# Patient Record
Sex: FEMALE | Race: WHITE | NOT HISPANIC OR LATINO | Employment: UNEMPLOYED | URBAN - METROPOLITAN AREA
[De-identification: names, ages, dates, MRNs, and addresses within clinical notes are randomized per-mention and may not be internally consistent; named-entity substitution may affect disease eponyms.]

---

## 2018-02-20 ENCOUNTER — COMMUNICATION - HEALTHEAST (OUTPATIENT)
Dept: SCHEDULING | Facility: CLINIC | Age: 28
End: 2018-02-20

## 2018-07-30 ENCOUNTER — HOSPITAL ENCOUNTER (OUTPATIENT)
Dept: OBGYN | Facility: HOSPITAL | Age: 28
Discharge: HOME | End: 2018-07-30
Attending: FAMILY MEDICINE | Admitting: FAMILY MEDICINE

## 2018-07-30 LAB
ABO/RH(D): NORMAL
ALBUMIN UR-MCNC: ABNORMAL MG/DL
ALT SERPL W P-5'-P-CCNC: 18 U/L (ref 0–45)
ANTIBODY SCREEN: NEGATIVE
APTT PPP: 28 SECONDS (ref 24–37)
AST SERPL W P-5'-P-CCNC: 23 U/L (ref 0–40)
CREAT SERPL-MCNC: 0.66 MG/DL (ref 0.6–1.1)
CREAT UR-MCNC: 215.3 MG/DL
ERYTHROCYTE [DISTWIDTH] IN BLOOD BY AUTOMATED COUNT: 12.4 % (ref 11–14.5)
GFR SERPL CREATININE-BSD FRML MDRD: >60 ML/MIN/1.73M2
GLUCOSE UR STRIP-MCNC: NEGATIVE MG/DL
HCT VFR BLD AUTO: 32.5 % (ref 35–47)
HGB BLD-MCNC: 10.7 G/DL (ref 12–16)
INR PPP: 1 (ref 0.9–1.1)
KETONES UR STRIP-MCNC: NEGATIVE MG/DL
MCH RBC QN AUTO: 29.6 PG (ref 27–34)
MCHC RBC AUTO-ENTMCNC: 32.9 G/DL (ref 32–36)
MCV RBC AUTO: 90 FL (ref 80–100)
PLATELET # BLD AUTO: 234 THOU/UL (ref 140–440)
PMV BLD AUTO: 11.3 FL (ref 8.5–12.5)
PROTEIN, RANDOM URINE - HISTORICAL: 27 MG/DL
PROTEIN/CREAT RATIO, RANDOM UR: 0.13
RBC # BLD AUTO: 3.62 MILL/UL (ref 3.8–5.4)
URATE SERPL-MCNC: 5.2 MG/DL (ref 2–7.5)
WBC: 10.7 THOU/UL (ref 4–11)

## 2018-07-30 ASSESSMENT — MIFFLIN-ST. JEOR: SCORE: 1596.32

## 2018-08-01 LAB
ALLERGIC TO PENICILLIN: NO
GP B STREP DNA SPEC QL NAA+PROBE: NEGATIVE

## 2018-08-06 LAB
ALCOHOL, URINE (MT) - HISTORICAL: NEGATIVE GM/DL
AMPHETAMINES (MT) - HISTORICAL: POSITIVE
AMPHETAMINES SPEC QL: ABNORMAL
COCAINE (MT) - HISTORICAL: NEGATIVE
CREAT UR-MCNC: 206 MG/DL
OPIATES (MT) - HISTORICAL: NEGATIVE
OXYCODONE SERPLBLD CFM-MCNC: NEGATIVE NG/ML
PHENCYCLIDINE (PCP)MT - HISTORICAL: NEGATIVE
SPECIMEN STATUS: ABNORMAL
THC MARIJUANA METABOLITE - HISTORICAL: NEGATIVE

## 2018-08-12 ENCOUNTER — COMMUNICATION - HEALTHEAST (OUTPATIENT)
Dept: SCHEDULING | Facility: CLINIC | Age: 28
End: 2018-08-12

## 2018-08-20 ENCOUNTER — RECORDS - HEALTHEAST (OUTPATIENT)
Dept: ADMINISTRATIVE | Facility: OTHER | Age: 28
End: 2018-08-20

## 2019-02-28 ENCOUNTER — OFFICE VISIT (OUTPATIENT)
Dept: OPHTHALMOLOGY | Facility: CLINIC | Age: 29
End: 2019-02-28
Attending: OPHTHALMOLOGY
Payer: COMMERCIAL

## 2019-02-28 DIAGNOSIS — H10.012 ACUTE FOLLICULAR CONJUNCTIVITIS OF LEFT EYE: Primary | ICD-10-CM

## 2019-02-28 PROCEDURE — G0463 HOSPITAL OUTPT CLINIC VISIT: HCPCS | Mod: ZF

## 2019-02-28 ASSESSMENT — CONF VISUAL FIELD
OD_NORMAL: 1
OS_NORMAL: 1

## 2019-02-28 ASSESSMENT — SLIT LAMP EXAM - LIDS: COMMENTS: NORMAL

## 2019-02-28 ASSESSMENT — VISUAL ACUITY
OS_SC: 20/30
METHOD: SNELLEN - LINEAR
OD_SC: 20/20

## 2019-02-28 ASSESSMENT — TONOMETRY
OD_IOP_MMHG: 18
OS_IOP_MMHG: 22
IOP_METHOD: TONOPEN

## 2019-02-28 ASSESSMENT — CUP TO DISC RATIO
OD_RATIO: 0.3
OS_RATIO: 0.3

## 2019-02-28 ASSESSMENT — EXTERNAL EXAM - RIGHT EYE: OD_EXAM: NORMAL

## 2019-02-28 NOTE — PROGRESS NOTES
HPI     Eye Infection Left Eye     In left eye.  Associated symptoms include tearing, lid swelling, photophobia and blurred vision.  Negative for itching.              Comments     Pt here for an evaluation for possible eye infection LE. Pt states symptoms first started 2 days ago, the LE was pink and irritated. This AM when pt woke up LE was swollen, really red, and watering a lot. Pt notes her son has viral pink eye and was given drops, so pt put in one of his drops in her eye (polymyacin). Pt has not put in any more drops in today. Pt notes pain only when she looks to the left, the eye itself does not hurt or have any pain. There is a little bit of light sensitivity. There was a little mattering yesterday, but just watery today.     HELADIO Lambert 3:31 PM February 28, 2019             Last edited by Aleksandra Johnson COMT on 2/28/2019  3:35 PM. (History)          POH: readers  PMH: none   FHx: no glaucoma    SHx: 7 pack years    Ocular Meds: none      ASSESSMENT & PLAN:    Kristie Rose is a 29 year old female with the following diagnoses:     # Follicular conjunctivitis left eye  Gradual onset of photophobia and injection over past two day, patient report boyfriend and son both have pink eye  She presented to outside ED, a CT scan was obtained that revealed air abutting the globe, she was sent here for further evaluation   Exam with follicular conjunctivitis, likely viral, also present is chemosis that is likely the air seen on previous CT scan  Presentation not consistent with pre-septal cellulitis   Discussed natural course  Start hand hygiene   Start AT four times a day left eye   Cool compress as needed     Patient disposition: Return in about 3 weeks (around 3/21/2019) for Follow Up if not improved, v/t .     Abran Bazan MD  Ophthalmology Resident, PGY-3  [facility]     Not seen by staff during this visit, available should need have arisen.  Plan appropriate as above.    Magdaleno Laguna,  MD  , Comprehensive Ophthalmology  Department of Ophthalmology and Visual Neurosciences  [facility]

## 2019-02-28 NOTE — NURSING NOTE
Chief Complaint(s) and History of Present Illness(es)     Eye Infection Left Eye     In left eye.  Associated symptoms include tearing, lid swelling, photophobia and blurred vision.  Negative for itching.              Comments     Pt here for an evaluation for possible eye infection LE. Pt states symptoms first started 2 days ago, the LE was pink and irritated. This AM when pt woke up LE was swollen, really red, and watering a lot. Pt notes her son has viral pink eye and was given drops, so pt put in one of his drops in her eye (polymyacin). Pt has not put in any more drops in today. Pt notes pain only when she looks to the left, the eye itself does not hurt or have any pain. There is a little bit of light sensitivity. There was a little mattering yesterday, but just watery today.     Aleksandra Johnson, COMT 3:31 PM February 28, 2019

## 2019-03-01 ENCOUNTER — TELEPHONE (OUTPATIENT)
Dept: OPTOMETRY | Facility: CLINIC | Age: 29
End: 2019-03-01

## 2019-03-01 NOTE — TELEPHONE ENCOUNTER
Please schedule patient with any resident clinic on 3/4 or 3/5.   Pt was seen in outside ER today and needs follow-up.     =--    Message above received by triage this Friday afternoon by eye MD on call.    Pt last seen yesterday in clinic and needing follow after going to outside ED today per message above    Scheduled on Monday with Dr Barnett at 0830 AM    Called pt 3 times to update pt on date/time  [phone]  Number states not excepting calls      Note to Dr. Tiffany Chapa RN 2:36 PM 03/01/19

## 2019-03-06 ENCOUNTER — TELEPHONE (OUTPATIENT)
Dept: OPHTHALMOLOGY | Facility: CLINIC | Age: 29
End: 2019-03-06

## 2019-03-06 NOTE — TELEPHONE ENCOUNTER
M[place] Kristie [phone]     Was able to reach pt 3rd attempt at [phone]  Scheduled f/u appt from ED visit 3-1-19 with Dr. Enrique tomorrow afternoon      Pt satisfied with date/time  Mauricio Chapa RN 3:52 PM 03/06/19    Previously reached out to pt last Friday and was unable reach/leave voicemail              [facility]    Phone Message    May a detailed message be left on voicemail: yes    Reason for Call: Other: per pt- was seen in the ED on 02/28/2019 for left eye swelling, it is getting worse, excessive tears, super sensitive to light, and bleeding occasionally- pt missed appt on 03/04/2019 due to her numbering changing, i updated #. please call pt to schedule a hospital f/u thank you      Action Taken: Message routed to:  [facility] ([facility] eye

## 2019-03-07 ENCOUNTER — OFFICE VISIT (OUTPATIENT)
Dept: OPHTHALMOLOGY | Facility: CLINIC | Age: 29
End: 2019-03-07
Attending: OPHTHALMOLOGY
Payer: COMMERCIAL

## 2019-03-07 DIAGNOSIS — H10.013 ACUTE FOLLICULAR CONJUNCTIVITIS OF BOTH EYES: Primary | ICD-10-CM

## 2019-03-07 PROCEDURE — 87491 CHLMYD TRACH DNA AMP PROBE: CPT | Performed by: OPHTHALMOLOGY

## 2019-03-07 PROCEDURE — G0463 HOSPITAL OUTPT CLINIC VISIT: HCPCS | Mod: ZF

## 2019-03-07 PROCEDURE — 84999 UNLISTED CHEMISTRY PROCEDURE: CPT | Performed by: STUDENT IN AN ORGANIZED HEALTH CARE EDUCATION/TRAINING PROGRAM

## 2019-03-07 PROCEDURE — 87253 VIRUS INOCULATE TISSUE ADDL: CPT | Performed by: STUDENT IN AN ORGANIZED HEALTH CARE EDUCATION/TRAINING PROGRAM

## 2019-03-07 PROCEDURE — 84999 UNLISTED CHEMISTRY PROCEDURE: CPT | Mod: ZF | Performed by: STUDENT IN AN ORGANIZED HEALTH CARE EDUCATION/TRAINING PROGRAM

## 2019-03-07 PROCEDURE — 87523 HEPATITIS D QUANTIFICATION: CPT | Mod: ZF | Performed by: STUDENT IN AN ORGANIZED HEALTH CARE EDUCATION/TRAINING PROGRAM

## 2019-03-07 PROCEDURE — 87252 VIRUS INOCULATION TISSUE: CPT | Performed by: OPHTHALMOLOGY

## 2019-03-07 PROCEDURE — 87070 CULTURE OTHR SPECIMN AEROBIC: CPT | Performed by: OPHTHALMOLOGY

## 2019-03-07 RX ORDER — FLUOROMETHOLONE 0.1 %
1 SUSPENSION, DROPS(FINAL DOSAGE FORM)(ML) OPHTHALMIC (EYE) 2 TIMES DAILY
Qty: 10 ML | Refills: 0 | Status: SHIPPED | OUTPATIENT
Start: 2019-03-07

## 2019-03-07 ASSESSMENT — CONF VISUAL FIELD
OS_NORMAL: 1
OD_NORMAL: 1

## 2019-03-07 ASSESSMENT — VISUAL ACUITY
OS_CC: 20/30
OD_CC+: -1
OD_CC: 20/25
METHOD: SNELLEN - LINEAR

## 2019-03-07 ASSESSMENT — TONOMETRY
IOP_METHOD: ICARE
OS_IOP_MMHG: 10
OD_IOP_MMHG: 10

## 2019-03-07 ASSESSMENT — EXTERNAL EXAM - RIGHT EYE: OD_EXAM: PERIORBITAL ERYTHEMA

## 2019-03-07 NOTE — NURSING NOTE
Chief Complaints and History of Present Illnesses   Patient presents with     Conjunctivitis Follow-Up     Chief Complaint(s) and History of Present Illness(es)     Conjunctivitis Follow-Up     Laterality: left eye              Comments     Pt. States that she started with pain and discomfort LE a week ago.  Has now moved in to RE.  Extremely light sensitive.   Has been taking clindamycin for step.  Andreea Chavez COT 1:13 PM March 7, 2019

## 2019-03-07 NOTE — PROGRESS NOTES
HPI     Conjunctivitis Follow-Up     In left eye.              Comments     Pt. States that she started with pain and discomfort LE a week ago.  Has now moved in to RE.  Extremely light sensitive.   Has been taking clindamycin for step.  Andreea Chavez COT 1:13 PM March 7, 2019               Last edited by Andreea Chavez on 3/7/2019  1:13 PM. (History)           Seen by Dr. Bazan 2/28/19 and diagnosed with follicular conjunctivitis in the left eye. Was then seen at outside ED 3/1/19 for left periorbital cellulitis and started on clindamycin 150mg orally three times a day x10d (currently on day 7).     She reports that she continue to be very light sensitive especially in the morning - describes significant eye pain (LEFT eye>RIGHT eye). Significant mucous drainage both eyes. Has noticed bloody tears when wiping the eyes. Not on any eyedrops currently.     Patient was diagnosed with strep throat at ED 3/1/19.     No systemic symptoms including dysuria, vaginal discharge, concern for STI. Son (6mo old) had pinkeye for 1 week 1-2 weeks ago.     POH: no CL use  PMH: none   SHx: 7 pack years    Ocular Meds: none       ASSESSMENT & PLAN:    Kristie Rose is a 29 year old female with the following diagnoses:     # Acute conjunctivitis, both eyes (LEFT > RIGHT)  -Concern for EKC (adenovirus) due to subepithelial infiltrates and significant conjunctival membranes   -Swabs sent to lab (bacterial, viral, chlamydia) - pending  -Start FML twice a day left eye (discussed that this may prolong viral shedding)  -Discussed hand hygiene given high degree of contagiousness  -Discussed return precautions    Patient disposition: Return in about 1 week (around 3/14/2019) for f/u conjunctivitis.     Lisa Enrique MD   Ophthalmology PGY-4    Attending Physician Attestation:  Complete documentation of historical and exam elements from today's encounter can be found in the full encounter summary report (not reduplicated in this  progress note).  I personally obtained the chief complaint(s) and history of present illness.  I confirmed and edited as necessary the review of systems, past medical/surgical history, family history, social history, and examination findings as documented by others; and I examined the patient myself.  I personally reviewed the relevant tests, images, and reports as documented above.  I formulated and edited as necessary the assessment and plan and discussed the findings and management plan with the patient and family. . - Magdaleno Laguna MD

## 2019-03-08 LAB
C TRACH DNA SPEC QL NAA+PROBE: NEGATIVE
SPECIMEN SOURCE: NORMAL

## 2019-03-09 LAB
BACTERIA SPEC CULT: NO GROWTH
Lab: NORMAL
SPECIMEN SOURCE: NORMAL

## 2019-03-21 LAB
SPECIMEN SOURCE: NORMAL
VIRUS SPEC CULT: NORMAL
VIRUS SPEC CULT: NORMAL

## 2019-03-22 ENCOUNTER — HEALTH MAINTENANCE LETTER (OUTPATIENT)
Age: 29
End: 2019-03-22

## 2020-01-03 ENCOUNTER — COMMUNICATION - HEALTHEAST (OUTPATIENT)
Dept: SCHEDULING | Facility: CLINIC | Age: 30
End: 2020-01-03

## 2020-03-01 ENCOUNTER — HEALTH MAINTENANCE LETTER (OUTPATIENT)
Age: 30
End: 2020-03-01

## 2020-08-13 ENCOUNTER — COMMUNICATION - HEALTHEAST (OUTPATIENT)
Dept: SCHEDULING | Facility: CLINIC | Age: 30
End: 2020-08-13

## 2020-08-14 ENCOUNTER — OFFICE VISIT - HEALTHEAST (OUTPATIENT)
Dept: FAMILY MEDICINE | Facility: CLINIC | Age: 30
End: 2020-08-14

## 2020-08-14 DIAGNOSIS — N92.6 MISSED MENSES: ICD-10-CM

## 2020-08-14 DIAGNOSIS — A08.4 VIRAL GASTROENTERITIS: ICD-10-CM

## 2020-08-14 DIAGNOSIS — Z32.01 PREGNANCY TEST POSITIVE: ICD-10-CM

## 2020-08-14 LAB — HCG UR QL: POSITIVE

## 2020-08-19 ENCOUNTER — COMMUNICATION - HEALTHEAST (OUTPATIENT)
Dept: FAMILY MEDICINE | Facility: CLINIC | Age: 30
End: 2020-08-19

## 2020-11-15 ENCOUNTER — COMMUNICATION - HEALTHEAST (OUTPATIENT)
Dept: SCHEDULING | Facility: CLINIC | Age: 30
End: 2020-11-15

## 2020-12-14 ENCOUNTER — HEALTH MAINTENANCE LETTER (OUTPATIENT)
Age: 30
End: 2020-12-14

## 2020-12-16 ENCOUNTER — COMMUNICATION - HEALTHEAST (OUTPATIENT)
Dept: SCHEDULING | Facility: CLINIC | Age: 30
End: 2020-12-16

## 2021-04-17 ENCOUNTER — HEALTH MAINTENANCE LETTER (OUTPATIENT)
Age: 31
End: 2021-04-17

## 2021-06-01 VITALS — BODY MASS INDEX: 34.37 KG/M2 | BODY MASS INDEX: 35.43 KG/M2 | WEIGHT: 194 LBS | HEIGHT: 63 IN

## 2021-06-01 VITALS — HEIGHT: 63 IN | WEIGHT: 200 LBS | BODY MASS INDEX: 35.44 KG/M2

## 2021-06-04 VITALS
RESPIRATION RATE: 17 BRPM | BODY MASS INDEX: 35.36 KG/M2 | OXYGEN SATURATION: 97 % | TEMPERATURE: 98.6 F | WEIGHT: 206 LBS | DIASTOLIC BLOOD PRESSURE: 75 MMHG | SYSTOLIC BLOOD PRESSURE: 106 MMHG | HEART RATE: 91 BPM

## 2021-06-04 NOTE — TELEPHONE ENCOUNTER
Pt called to discuss light pink vaginal bleeding. Pt is 7 weeks pregnant. Pt stated this was the first time she has noticed any vaginal discharge. Pt stated she has had intercourse within last 24 hours. Pt feeling mild rt sided cramping for 24 hours. Per protocol home care suggestions were reviewed and understood. Plan is to call back if pain increase, vaginal bleeding worsens or pass any tissue. Pt also will contact PCP to be seen within next 24 hours. Pt agreed with plan    Farheen Carbone RN [facility] Triage/Medication Refill      Reason for Disposition    [1] Intermittent lower abdominal pain (e.g., cramping) AND [2] present > 24 hours    Protocols used: PREGNANCY - VAGINAL BLEEDING LESS THAN 20 WEEKS [facility]-

## 2021-06-10 NOTE — TELEPHONE ENCOUNTER
"Pt reports she is six weeks pregnant and \"no solid poop in three days\". Vomiting yesterday but not today. Pt reports stool is \"red\" however, per pt \"tinted\" and pt reports drinking a drink with red # 40 in it. Pt does not think redness is blood. Pt reports pain right above belly button \"3\" and  intermittent. Urinated five minutes ago. Pt does not think she has a fever. Pt reports approximately five more stools than normal daily for past three days and quite watery.    Advised pt to increase fluids, avoid drinks with food dye. Mix water and gatorade and eat starchy salty food for now ie saltines, pretzels. Call back if condition changes prior to being seen. See provider within 24 hours per protocol.    Pt verbalizes understanding and agrees to plan.       Reason for Disposition    [1] MODERATE diarrhea (e.g., 4-6 times / day more than normal) AND [2] present > 48 hours (2 days)    Additional Information    Negative: Shock suspected (e.g., cold/pale/clammy skin, too weak to stand, low BP, rapid pulse)    Negative: Difficult to awaken or acting confused (e.g., disoriented, slurred speech)    Negative: Sounds like a life-threatening emergency to the triager    Negative: Vomiting also present and worse than the diarrhea    Negative: [1] Blood in stool AND [2] without diarrhea    Negative: Diarrhea in a cancer patient who is currently (or recently) receiving chemotherapy or radiation therapy, or cancer patient who has metastatic or end-stage cancer and is receiving palliative care    Negative: [1] SEVERE abdominal pain (e.g., excruciating) AND [2] present > 1 hour    Negative: [1] SEVERE abdominal pain AND [2] age > 60    Negative: [1] Blood in the stool AND [2] moderate or large amount of blood    Negative: Black or tarry bowel movements  (Exception: chronic-unchanged  black-grey bowel movements AND is taking iron pills or Pepto-bismol)    Negative: [1] Drinking very little AND [2] dehydration suspected (e.g., no urine > " 12 hours, very dry mouth, very lightheaded)    Negative: Patient sounds very sick or weak to the triager    Negative: [1] SEVERE diarrhea (e.g., 7 or more times / day more than normal) AND [2] age > 60 years    Negative: [1] Constant abdominal pain AND [2] present > 2 hours    Negative: [1] Fever > 103 F (39.4 C) AND [2] not able to get the fever down using Fever Care Advice    Negative: [1] SEVERE diarrhea (e.g., 7 or more times / day more than normal) AND [2] present > 24 hours (1 day)    Protocols used: [facility]
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
## 2021-06-10 NOTE — TELEPHONE ENCOUNTER
Pt needs to set up 1st pregnancy apt with womens care to follow her pregnancy. Positive pregnancy test 8/14/20 at [facility]

## 2021-06-13 NOTE — TELEPHONE ENCOUNTER
"Pt reports \"I brought brother in law to hospital for chest pain and he tested positive in hospital for Covid 19\" this evening. Pt wondering \"how long do I have to wait to get tested\". Pt reports she is 24w4d pregnant. Pt reports no acute symptoms. Brother in elaina symptoms started today. Ob/gyn is through [facility]. Pt reports she was in the car with brother in law for 12-15 minutes today and he had a mask on.     Reviewed Care Advice with pt, see below. Advised pt to contact her ob/gyn in the morning.     Pt verbalizes understanding and agrees to plan.     COVID 19 Nurse Triage Plan/Patient Instructions    Please be aware that novel coronavirus (COVID-19) may be circulating in the community. If you develop symptoms such as fever, cough, or SOB or if you have concerns about the presence of another infection including coronavirus (COVID-19), please contact your health care provider or visit [email or web address].     Disposition/Instructions    Additional COVID19 information to add for patients.   How can I protect others?  If you have symptoms (fever, cough, body aches or trouble breathing): Stay home and away from others (self-isolate) until:    At least 10 days have passed since your symptoms started, And     You ve had no fever--and no medicine that reduces fever--for 1 full day (24 hours), And      Your other symptoms have resolved (gotten better).     If you don t have symptoms, but a test showed that you have COVID-19 (you tested positive):    Stay home and away from others (self-isolate). Follow the tips under \"How do I self-isolate?\" below for 10 days (20 days if you have a weak immune system).    You don't need to be retested for COVID-19 before going back to school or work. As long as you're fever-free and feeling better, you can go back to school, work and other activities after waiting the 10 or 20 days.     How do I self-isolate?    Stay in your own room, even for meals. Use your own bathroom if you can.     Stay " away from others in your home. No hugging, kissing or shaking hands. No visitors.    Don t go to work, school or anywhere else.     Clean  high touch  surfaces often (doorknobs, counters, handles, etc.). Use a household cleaning spray or wipes. You ll find a full list on the EPA website:  www.epa.gov/pesticide-registration/list-n-disinfectants-use-against-sars-cov-2.    Cover your mouth and nose with a mask, tissue or washcloth to avoid spreading germs.    Wash your hands and face often. Use soap and water.    Caregivers in these groups are at risk for severe illness due to COVID-19:  o People 65 years and older  o People who live in a nursing home or long-term care facility  o People with chronic disease (lung, heart, cancer, diabetes, kidney, liver, immunologic)  o People who have a weakened immune system, including those who:  - Are in cancer treatment  - Take medicine that weakens the immune system, such as corticosteroids  - Had a bone marrow or organ transplant  - Have an immune deficiency  - Have poorly controlled HIV or AIDS  - Are obese (body mass index of 40 or higher)  - Smoke regularly    Caregivers should wear gloves while washing dishes, handling laundry and cleaning bedrooms and bathrooms.    Use caution when washing and drying laundry: Don t shake dirty laundry, and use the warmest water setting that you can.    For more tips, go to www.cdc.gov/coronavirus/2019-ncov/downloads/10Things.pdf.    How can I take care of myself?  1. Get lots of rest. Drink extra fluids (unless a doctor has told you not to).     2. Take Tylenol (acetaminophen) for fever or pain. If you have liver or kidney problems, ask your family doctor if it s okay to take Tylenol.     Adults can take either:     650 mg (two 325 mg pills) every 4 to 6 hours, or     1,000 mg (two 500 mg pills) every 8 hours as needed.     Note: Don t take more than 3,000 mg in one day.   Acetaminophen is found in many medicines (both prescribed and  over-the-counter medicines). Read all labels to be sure you don t take too much.     For children, check the Tylenol bottle for the right dose. The dose is based on the child s age or weight.    3. If you have other health problems (like cancer, heart failure, an organ transplant or severe kidney disease): Call your specialty clinic if you don t feel better in the next 2 days.    4. Know when to call 911: Emergency warning signs include:    Trouble breathing or shortness of breath    Pain or pressure in the chest that doesn t go away    Feeling confused like you haven t felt before, or not being able to wake up    Bluish-colored lips or face    What are the symptoms of COVID-19?     The most common symptoms are cough, fever and trouble breathing.     Less common symptoms include body aches, chills, diarrhea (loose, watery poops), fatigue (feeling very tired), headache, runny nose, sore throat and loss of smell.    COVID-19 can cause severe coughing (bronchitis) and lung infection (pneumonia).    How does it spread?     The virus may spread when a person coughs or sneezes into the air. The virus can travel about 6 feet this way, and it can live on surfaces.      Common  (household disinfectants) will kill the virus.    Who is at risk?  Anyone can catch COVID-19 if they re around someone who has the virus.    How can others protect themselves?     Stay away from people who have COVID-19 (or symptoms of COVID-19).    Wash hands often with soap and water. Or, use hand  with at least 60% alcohol.    Avoid touching the eyes, nose or mouth.     Wear a face mask when you go out in public, when sick or when caring for a sick person.    Where can I get more information?     [employer]: About COVID-19: [email or web address]    CDC: What to Do If You re Sick: www.cdc.gov/coronavirus/2019-ncov/about/steps-when-sick.html    CDC: Ending Home Isolation:  www.cdc.gov/coronavirus/2019-ncov/hcp/disposition-in-home-patients.html     CDC: Caring for Someone: www.cdc.gov/coronavirus/2019-ncov/if-you-are-sick/care-for-someone.html     [facility]: Interim Guidance for Hospital Discharge to Home: www.[facility]/diseases/coronavirus/hcp/hospdischarge.pdf    [facility] clinical trials (COVID-19 research studies): clinicalaffairs.[facility]-clinical-trials     Below are the COVID-19 hotlines at the [place] [facility] ([facility]). Interpreters are available.   o For health questions: Call 045-215-3807 or 1-371.472.5400 (7 a.m. to 7 p.m.)  o For questions about schools and childcare: Call 974-808-6585 or 1-284.934.7625 (7 a.m. to 7 p.m.)              Thank you for taking steps to prevent the spread of this virus.  o Limit your contact with others.  o Wear a simple mask to cover your cough.  o Wash your hands well and often.    Resources    [facility]: About COVID-19: [email or web address]    CDC: What to Do If You're Sick: www.cdc.gov/coronavirus/2019-ncov/about/steps-when-sick.html    CDC: Ending Home Isolation: www.cdc.gov/coronavirus/2019-ncov/hcp/disposition-in-home-patients.html     CDC: Caring for Someone: www.cdc.gov/coronavirus/2019-ncov/if-you-are-sick/care-for-someone.html     [facility]: Interim Guidance for Hospital Discharge to Home: www.[facility]./diseases/coronavirus/hcp/hospdischarge.pdf    [facility] clinical trials (COVID-19 research studies): clinicalaffairs.[facility]-clinical-trials     Below are the COVID-19 hotlines at the [place] [facility] ([facility]). Interpreters are available.   o For health questions: Call 922-654-7135 or 1-813.562.8358 (7 a.m. to 7 p.m.)  o For questions about schools and childcare: Call 241-987-9986 or 1-676.372.3001 (7 a.m. to 7 p.m.)       Reason for Disposition    [1] CLOSE CONTACT COVID-19 EXPOSURE within last 14 days AND [2] NO symptoms    Additional Information     Negative: COVID-19 lab test positive    Negative: [1] Lives with someone known to have influenza (flu test positive) AND [2] flu-like symptoms (e.g., cough, runny nose, sore throat, SOB; with or without fever)    Negative: [1] Symptoms of COVID-19 (e.g., cough, fever, SOB, or others) AND [2] HCP diagnosed COVID-19 based on symptoms    Negative: [1] Symptoms of COVID-19 (e.g., cough, fever, SOB, or others) AND [2] lives in an area with community spread    Negative: [1] Symptoms of COVID-19 (e.g., cough, fever, SOB, or others) AND [2] within 14 days of EXPOSURE (close contact) with diagnosed or suspected COVID-19 patient    Negative: [1] Symptoms of COVID-19 (e.g., cough, fever, SOB, or others) AND [2] within 14 days of travel from high-risk area for COVID-19 community spread (identified by CDC)    Negative: [1] Difficulty breathing (shortness of breath) occurs AND [2] onset > 14 days after COVID-19 EXPOSURE (Close Contact)    Negative: [1] Dry cough occurs AND [2] onset > 14 days after COVID-19 EXPOSURE    Negative: [1] Wet cough (i.e., white-yellow, yellow, green, or thaddeus colored sputum) AND [2] onset > 14 days after COVID-19 EXPOSURE    Negative: [1] Common cold symptoms AND [2] onset > 14 days after COVID-19 EXPOSURE    Negative: [1] CLOSE CONTACT COVID-19 EXPOSURE within last 14 days AND [2] needs COVID-19 lab test to return to work AND [3] NO symptoms    Negative: [1] CLOSE CONTACT COVID-19 EXPOSURE within last 14 days AND [2] exposed person is a  (e.g., police or paramedic) AND [3] NO symptoms    Negative: [1] CLOSE CONTACT COVID-19 EXPOSURE within last 14 days AND [2] exposed person is a healthcare worker who was NOT using all recommended personal protective equipment (i.e., a respirator-N95 mask, eye protection, gloves, and gown) AND [3] NO symptoms    Negative: [1] Living or working in a correctional facility, long-term care facility, or shelter (i.e., congregate setting; densely populated)  AND [2] where an outbreak has occurred AND [3] NO symptoms    Protocols used: CORONAVIRUS (COVID-19) EXPOSURE-A- 12.1.20

## 2021-06-16 PROBLEM — Z98.891 STATUS POST REPEAT LOW TRANSVERSE CESAREAN SECTION: Status: ACTIVE | Noted: 2018-08-14

## 2021-06-16 PROBLEM — O99.323 SUBSTANCE ABUSE AFFECTING PREGNANCY IN THIRD TRIMESTER, ANTEPARTUM (H): Status: ACTIVE | Noted: 2018-08-13

## 2021-06-16 PROBLEM — F19.10 SUBSTANCE ABUSE AFFECTING PREGNANCY IN THIRD TRIMESTER, ANTEPARTUM (H): Status: ACTIVE | Noted: 2018-08-13

## 2021-06-17 NOTE — TELEPHONE ENCOUNTER
"Telephone Encounter by Miranda Gong RN at 11/15/2020  9:46 AM     Author: Miranda Gong RN Service: -- Author Type: Registered Nurse    Filed: 11/15/2020  9:55 AM Encounter Date: 11/15/2020 Status: Signed    : Miranda Gong RN (Registered Nurse)       Pt calling for education on pregnancy approved medications for nasal congestion.    Education given per home care advice and [facility] OneNote resource:         Patient verbalized understanding and had no further questions.      Miranda Gong RN, FNA      Additional Information  ? Negative: MORE THAN A DOUBLE DOSE of a prescription or over-the-counter (OTC) drug  ? Negative: [1] DOUBLE DOSE (an extra dose or lesser amount) of over-the-counter (OTC) drug AND [2] any symptoms (e.g., dizziness, nausea, pain, sleepiness)  ? Negative: [1] DOUBLE DOSE (an extra dose or lesser amount) of prescription drug AND [2] any symptoms (e.g., dizziness, nausea, pain, sleepiness)  ? Negative: Took another person's prescription drug  ? Negative: [1] DOUBLE DOSE (an extra dose or lesser amount) of prescription drug AND [2] NO symptoms (Exception: a double dose of antibiotics)  ? Negative: Diabetes drug error or overdose (e.g., insulin or extra dose)  ? Negative: [1] Request for URGENT new prescription or refill of \"essential\" medication (i.e., likelihood of harm to patient if not taken) AND [2] triager unable to fill per unit policy  ? Negative: [1] Prescription not at pharmacy AND [2] was prescribed today by PCP  ? Negative: Pharmacy calling with prescription questions and triager unable to answer question  ? Negative: Caller has URGENT medication question about med that PCP prescribed and triager unable to answer question  ? Negative: Caller has NON-URGENT medication question about med that PCP prescribed and triager unable to answer question  ? Negative: Caller requesting a NON-URGENT new prescription or refill and triager unable to refill per unit policy  ? Negative: " Caller has medication question about med not prescribed by PCP and triager unable to answer question (e.g., compatibility with other med, storage)  ? Negative: [1] DOUBLE DOSE (an extra dose or lesser amount) of over-the-counter (OTC) drug AND [2] NO symptoms  ? Negative: [1] DOUBLE DOSE (an extra dose or lesser amount) of antibiotic drug AND [2] NO symptoms  ? Negative: Caller has medication question only, adult not sick, and triager answers question  ? Negative: Caller has medication question, adult has minor symptoms, caller declines triage, and triager answers question  ? Caller requesting information about medication during pregnancy; adult is not ill and triager answers question    Protocols used: MEDICATION QUESTION CALL-A-

## 2021-06-18 NOTE — PATIENT INSTRUCTIONS - HE
Patient Instructions by Kellie Gore PA-C at 8/14/2020  1:30 PM     Author: Kellie Gore PA-C Service: -- Author Type: Physician Assistant    Filed: 8/14/2020  2:26 PM Encounter Date: 8/14/2020 Status: Signed    : Kellie Gore PA-C (Physician Assistant)       Patient Education   Keep appointment for prenatal visit.  Continue Zofran as needed.  Drink plenty of water & Gatorade.   If abdominal pain worsens or develop vaginal bleeding, go to ER.  Viral Gastroenteritis (Adult)    Gastroenteritis is commonly called the stomach flu. It is most often caused by a virus that affects the stomach and intestinal tract and usually lasts from 2 to 7 days. Common viruses causing gastroenteritis include norovirus, rotavirus, and hepatitis A. Non-viral causes of gastroenteritis include bacteria, parasites, and toxins.  The danger from repeated vomiting or diarrhea is dehydration. This is the loss of too much fluid from the body. When this occurs, body fluids must be replaced. Antibiotics do not help with this illness because it is usually viral.Simple home treatment will be helpful.  Symptoms of viral gastroenteritis may include:    Watery, loose stools    Stomach pain or abdominal cramps    Fever and chills    Nausea and vomiting    Loss of bowel control    Headache  Home care  Gastroenteritis is transmitted by contact with the stool or vomit of an infected person. This can occur from person to person or from contact with a contaminated surface.  Follow these guidelines when caring for yourself at home:    If symptoms are severe, rest at home for the next 24 hours or until you are feeling better.    Wash your hands with soap and water or use alcohol-based  to prevent the spread of infection. Wash your hands after touching anyone who is sick.    Wash your hands or use alcohol-based  after using the toilet and before meals. Clean the toilet after each use.  Remember these  tips when preparing food:    People with diarrhea should not prepare or serve food to others. When preparing foods, wash your hands before and after.    Wash your hands after using cutting boards, countertops, knives, or utensils that have been in contact with raw food.    Keep uncooked meats away from cooked and ready-to-eat foods.  Medicine  You may use acetaminophen or NSAID medicines like ibuprofen or naproxen to control fever unless another medicine was given. If you have chronic liver or kidney disease, talk with your healthcare provider before using these medicines. Also talk with your provider if you've had a stomach ulcer or gastrointestinal bleeding. Don't give aspirin to anyone under 18 years of age who is ill with a fever. It may cause severe liver damage. Don't use NSAIDS is you are already taking one for another condition (like arthritis) or are on aspirin (such as for heart disease or after a stroke).  If medicine for vomiting or diarrhea are prescribed, take these only as directed. Do not take over-the-counter medicines for vomiting or diarrhea unless instructed by your healthcare provider.  Diet  Follow these guidelines for food:    Water and liquids are important so you don't get dehydrated. Drink a small amount at a time or suck on ice chips if you are vomiting.    If you eat, avoid fatty, greasy, spicy, or fried foods.    Don't eat dairy if you have diarrhea. This can make diarrhea worse.    Avoid tobacco, alcohol, and caffeine which may worsen symptoms.  During the first 24 hours (the first full day), follow the diet below:    Beverages. Sports drinks, soft drinks without caffeine, ginger ale, mineral water (plain or flavored), decaffeinated tea and coffee. If you are very dehydrated, sports drinks aren't a good choice. They have too much sugar and not enough electrolytes. In this case, commercially available products called oral rehydration solutions, are best.    Soups. Eat clear broth,  consommé, and bouillon.    Desserts. Eat gelatin, popsicles, and fruit juice bars.  During the next 24 hours (the second day), you may add the following to the above:    Hot cereal, plain toast, bread, rolls, and crackers    Plain noodles, rice, mashed potatoes, chicken noodle or rice soup    Unsweetened canned fruit (avoid pineapple), bananas    Limit fat intake to less than 15 grams per day. Do this by avoiding margarine, butter, oils, mayonnaise, sauces, gravies, fried foods, peanut butter, meat, poultry, and fish.    Limit fiber and avoid raw or cooked vegetables, fresh fruits (except bananas), and bran cereals.    Limit caffeine and chocolate. Don't use spices or seasonings other than salt.    Limit dairy products.    Avoid alcohol.  During the next 24 hours:    Gradually resume a normal diet as you feel better and your symptoms improve.    If at any time it starts getting worse again, go back to clear liquids until you feel better.  Follow-up care  Follow up with your healthcare provider, or as advised. Call your provider if you don't get better within 24 hours or if diarrhea lasts more than a week. Also follow up if you are unable to keep down liquids and get dehydrated. If a stool (diarrhea) sample was taken, call as directed for the results.  Call 911  Call 911 if any of these occur:    Trouble breathing    Chest pain    Confused    Severe drowsiness or trouble awakening    Fainting or loss of consciousness    Rapid heart rate    Seizure    Stiff neck  When to seek medical advice  Call your healthcare provider right away if any of these occur:    Abdominal pain that gets worse    Continued vomiting (unable to keep liquids down)    Frequent diarrhea (more than 5 times a day)    Blood in vomit or stool (black or red color)    Dark urine, reduced urine output, or extreme thirst    Weakness or dizziness    Drowsiness    Fever of 100.4 F (38 C) or higher, or as directed by your healthcare provider    New  "rash  Date Last Reviewed: 1/3/2016    0186-8683 [employer]. [address] [place] [address], [place] [address]. All rights reserved. This information is not intended as a substitute for professional medical care. Always follow your healthcare professional's instructions.    Patient Education     [place] Diet  Your healthcare provider may recommend a bland diet if you have an upset stomach. It consists of foods that are mild and easy to digest. It is better to eat small frequent meals rather than 3 large meals a day.    Beverages  OK: Fruit juices, non-caffeinated teas and coffee, non-carbonated castle  Avoid: Carbonated beverage, caffeinated tea and coffee, all alcoholic beverages  Bread  OK: Refined white, wheat or rye bread, cem or soda crackers, Crystal toast, plain rolls, bagels  Avoid: Whole-grain bread  Cereal  OK: Refined cereals: cooked or ready to eat  Avoid: Whole-grain cereals and granola, or those containing bran, seeds or nuts  Desserts  OK: Peanut butter and all others except those to \"avoid\"  Avoid: Chocolate, cocoa, coconut, popcorn, nuts, seeds, jam, marmalade  Fruits  OK: Canned, cooked, frozen or fresh fruits without seeds or tough skin  Avoid: Olives, skin and seeds of fruit  Meats  OK: All fresh or preserved meat, fish and fowl  Avoid: Any that are prepared with those spices to \"avoid\"  Cheese and eggs  OK: Eggs, cottage cheese, cream cheese, other cheeses  Avoid: All cheeses made with those spices to \"avoid\"  Potatoes and pasta  OK: Potato, rice, macaroni, noodles, spaghetti  Avoid: None  Soups  OK: All soups without heavy seasoning  Avoid: Soups made with those spices to \"avoid\"  Vegetables  OK: Canned, cooked, fresh or frozen mildly flavored vegetables without seeds, skins or coarse fiber  Avoid: Vegetables prepared with those spices to \"avoid\"; skin and seeds of vegetables and those with coarse fiber  Spices  OK: Salt, lemon and lime juice, vinegar, all extracts, mode, cinnamon, thyme, " "mace, allspice, paprika  Avoid: Chili powder, cloves, pepper, seed spices, garlic, gravy pickles, highly seasoned salad dressings  Date Last Reviewed: 11/20/2015 2000-2017 [employer]. [address] [place] [id number]. All rights reserved. This information is not intended as a substitute for professional medical care. Always follow your healthcare professional's instructions.             Discharge Instructions for COVID-19 Patients  You have--or may have--COVID-19. Please follow the instructions listed below.   If you have a weakened immune system, discuss with your doctor any other actions you need to take.  How can I protect others?  If you have symptoms (fever, cough, body aches or trouble breathing):    Stay home and away from others (self-isolate) until:  ? At least 10 days have passed since your symptoms started. And?  ? You've had no fever--and no medicine that reduces fever--for 3 full days (72 hours). And?  ? Your other symptoms have resolved (gotten better).  If you don't show symptoms, but testing showed that you have COVID-19:    Stay home and away from others (self-isolate) until at least 10 days have passed since the date of your first positive COVID-19 test.  During this time    Stay in your own room, even for meals. Use your own bathroom if you can.    Stay away from others in your home. No hugging, kissing or shaking hands. No visitors.    Don't go to work, school or anywhere else.    Clean \"high touch\" surfaces often (doorknobs, counters, handles). Use household cleaning spray or wipes. You'll find a full list of  on the EPA website: www.epa.gov/pesticide-registration/list-n-disinfectants-use-against-sars-cov-2.    Cover your mouth and nose with a mask, tissue or wash cloth to avoid spreading germs.    Wash your hands and face often. Use soap and water.    Caregivers in these groups are at risk for severe illness due to COVID-19:  ? People 65 years and " older  ? People who live in a nursing home or long-term care facility  ? People with chronic disease (lung, heart, cancer, diabetes, kidney, liver, immunologic)  ? People who have a weakened immune system, including those who:    Are in cancer treatment    Take medicine that weakens the immune system, such as corticosteroids    Had a bone marrow or organ transplant    Have an immune deficiency    Have poorly controlled HIV or AIDS    Are obese (body mass index of 40 or higher)    Smoke regularly    Caregivers should wear gloves while washing dishes, handling laundry and cleaning bedrooms and bathrooms.    Use caution when washing and drying laundry: Don't shake dirty laundry and use the warmest water setting that you can.    For more tips on managing your health at home, go to www.cdc.gov/coronavirus/2019-ncov/downloads/10Things.pdf.  How can I take care of myself at home?  1. Get lots of rest. Drink extra fluids (unless a doctor has told you not to).  2. Take Tylenol (acetaminophen) for fever or pain. If you have liver or kidney problems, ask your family doctor if it's okay to take Tylenol.     Adults can take either:  ? 650 mg (two 325 mg pills) every 4 to 6 hours, or?  ? 1,000 mg (two 500 mg pills) every 8 hours as needed.  ? Note: Don't take more than 3,000 mg in one day. Acetaminophen is found in many medicines (both prescribed and over-the-counter medicines). Read all labels to be sure you don't take too much.  For children, check the Tylenol bottle for the right dose. The dose is based on the child's age or weight.  3. If you have other health problems (like cancer, heart failure, an organ transplant or severe kidney disease): Call your specialty clinic if you don't feel better in the next 2 days.  4. Know when to call 911. Emergency warning signs include:  ? Trouble breathing or shortness of breath  ? Pain or pressure in the chest that doesn't go away  ? Feeling confused like you haven't felt before, or not  being able to wake up  ? Bluish-colored lips or face  5. Your doctor may have prescribed a blood thinner medicine. Follow their instructions.  Where can I get more information?    [facility] - About COVID-19:   [email or web address]    CDC - What to Do If You're Sick: www.cdc.gov/coronavirus/2019-ncov/about/steps-when-sick.html    CDC - Ending Home Isolation: www.cdc.gov/coronavirus/2019-ncov/hcp/disposition-in-home-patients.html    [facility] - Caring for Someone: www.cdc.gov/coronavirus/2019-ncov/if-you-are-sick/care-for-someone.html    [facility] - Interim Guidance for Hospital Discharge to Home: www.[facility].[place]/diseases/coronavirus/hcp/hospdischarge.pdf    [facility] clinical trials (COVID-19 research studies): clinicalaffairs.[facility]-clinical-trials    Below are the COVID-19 hotlines at the [place] [facility] ([facility]). Interpreters are available.  ? For health questions: Call 071-262-6553 or 1-461.961.1713 (7 a.m. to 7 p.m.)  ? For questions about schools and childcare: Call 458-139-3832 or 1-273.546.4749 (7 a.m. to 7 p.m.)    For informational purposes only. Not to replace the advice of your health care provider. Clinically reviewed by the Infection Prevention Team.Copyright   2020 [facility]. All rights reserved. [employer] 192055 - 06/20.

## 2021-06-19 NOTE — PROGRESS NOTES
OB Triage Note     Kristie Rose is a 28 y.o.  at 37w4d of gestation based on 2nd trimester dating ultrasound who has presented to maternity care for further evaluation of Lower extremity edema. This has been going on a couple weeks but the patient finds very aggravating today.  She denies vaginal bleeding, abdominal pain, or decreased fetal movement.      PRENATAL CARE: Initial OB visit at [facility] but o further prenatal care  PAST MEDICAL HISTORY: Depression                        PRIOR PREGNANCIES: 1, Hx of gastroschisis requiring  per the patient but cannot find records of                        HISTORY OF COMPLICATIONS : See above                         PAST SURGICAL HISTORY: C-sections x 1     MEDICATIONS:  Prenatal vitamins     SOCIAL HISTORY:                         Smoking: Current everyday smoker                        Alcohol: None                        Illicit drug use: None, former meth user     PHYSICAL EXAMINATION:   Temp:  [98.9  F (37.2  C)] 98.9  F (37.2  C)  Heart Rate:  [] 111  Resp:  [18-20] 18  BP: (122-136)/(75-92) 136/87      General: alert, well-appearing, non-toxic, NAD  HEENT: NC/AT, PERRLA, normal conjunctivae and sclerae, clear nares, MMM, normal oropharynx, neck supple  CV: RRR, no murmurs, rubs or gallops, 2+ peripheral pulses  Respiratory: normal effort, lungs CTA bilaterally with good aeration throughout  Abdomen: gravid, soft, non-tender  Extremities: warm, 1+ edema     FETAL HEART MONITORING: Category 1, , Moderate variability, accelerations present, decelerations absent     LAB RESULTS: Reviewed and normal      Recent Results          Recent Results (from the past 24 hour(s))   Protein/Creatinine Ratio, Urine Random     Collection Time: 18  7:53 PM   Result Value Ref Range      Protein, Random Urine 27 mg/dL     Creatinine, Urine 215.3 mg/dL     Protein/Creatinine Ratio, Random Urine 0.13     Urinalysis, OB Screen     Collection  Time: 07/30/18  7:54 PM   Result Value Ref Range     Glucose, UA Negative Negative     Ketones, UA Negative Negative, 60 mg/dL     Protein, UA 50 mg/dL (!) Negative mg/dL   ALT (current @ALT(SGPT)     Collection Time: 07/30/18  8:02 PM   Result Value Ref Range     ALT 18 0 - 45 U/L   AST (current @AST(SGOT)     Collection Time: 07/30/18  8:02 PM   Result Value Ref Range     AST 23 0 - 40 U/L   INR     Collection Time: 07/30/18  8:02 PM   Result Value Ref Range     INR 1.00 0.90 - 1.10   APTT(PTT)     Collection Time: 07/30/18  8:02 PM   Result Value Ref Range     PTT 28 24 - 37 seconds   HM2(CBC w/o Differential)     Collection Time: 07/30/18  8:02 PM   Result Value Ref Range     WBC 10.7 4.0 - 11.0 thou/uL     RBC 3.62 (L) 3.80 - 5.40 mill/uL     Hemoglobin 10.7 (L) 12.0 - 16.0 g/dL     Hematocrit 32.5 (L) 35.0 - 47.0 %     MCV 90 80 - 100 fL     MCH 29.6 27.0 - 34.0 pg     MCHC 32.9 32.0 - 36.0 g/dL     RDW 12.4 11.0 - 14.5 %     Platelets 234 140 - 440 thou/uL     MPV 11.3 8.5 - 12.5 fL   Type and Screen     Collection Time: 07/30/18  8:02 PM   Result Value Ref Range     ABORh O POS       Antibody Screen Negative Negative   Creatinine     Collection Time: 07/30/18  8:02 PM   Result Value Ref Range     Creatinine 0.66 0.60 - 1.10 mg/dL     GFR MDRD Af Amer >60 >60 mL/min/1.73m2     GFR MDRD Non Af Amer >60 >60 mL/min/1.73m2   Uric Acid     Collection Time: 07/30/18  8:02 PM   Result Value Ref Range     Uric Acid 5.2 2.0 - 7.5 mg/dL         ASSESSMENT/PLAN:      1. Pregnant female in third trimester: dating by 2nd trimester ultrasound. Also had initial prenatal labs that were unremarkable. No other prenatal care. Not maria l here and category 1 FHR. No concerning symptoms. Obtained preeclampsia labs as patient's BP borderline. Essential normal. Slightly anemic at 10.7. Spoke with Dr. More who requested the patient follow up with her in on 8/1/18.   - LFT, INR, PTT, CBC, type and screen, UA,     2. Hx of  methamphetamine abuse: Claims not using now  - Urine tox sent     Asad Kate MD, PGY1  Pager: [phone]     Precepted patient with in-house OB Dr. More

## 2021-06-19 NOTE — PROGRESS NOTES
Kristie educated on discharge instructions and following up with Dr More, clinic number provided on instructions. GBS swab obtained. Medtox sent for pt history of meth use and current smoker. Pt leaves ambulatory with current boyfriend

## 2021-06-19 NOTE — PROGRESS NOTES
"Kristie and friend arrive to L&D via ER for c/o \"swollen legs\".  Kristie moved from w/c to bed.  Pt states that she is pregnant and due Aug 6th from an u/s done around 16-20 weeks.   EFM applied,   No ctx noted, FHT's accels, mod variability. VS as noted.  States that she does not have a clinic for prenatal care, , MD or CNED for this pregnancy and aside from U/S has had no prenatal care r/t the request of her S.O. (no longer involved).   She states this is her 2nd pregnancy delivering via C/S at [facility] 9 yrs ago.  She desires a C/S with this pregnancy.  States that she takes PNV gummies daily.  States she is a smoker. Denies any other risk factors at this time.  States infant active.  Denies bleeding, LOF, ctx.  Denies fear for her safety at this time as FOB is in [place].  Appears to have good support from friend who is at bedside.   Noted to have pitting to mid calf bilaterally and painful to touch.  Report to receiving RN.  "

## 2021-06-19 NOTE — PROGRESS NOTES
"SW received tox screen final positive for amphetamine. Per chart review, ASHER notes hx of methamphetamine abuse.  ASHER notes per chart review that pt claimed not using on 7/30/18.  Reviewed with [facility] Pharmacist.   States no prescriptions filled within the last year that would show a false positive.  States pt was prescribed Adderall with [facility] in the past, but not within the last year.  States pt had an \"accidental overdose\" with \"intentional ingestion\" on Aug 31st in 2017 charted due to \"pt being involved in a drug bust and swallowed bag of pills.\"     Call to [facility] CPS.  No answer.  V/M left with report and  contact info.    ASHER Damico  8/8/2018  2:52 PM    Tox screen result, above CM note, facesheet and drug screen report form faxed to [facility] CPS.  ASHER received confirmation that fax was sent successfully.     Pt added to [facility] watch list.     ASHER Damico  8/8/2018  3:17 PM     "

## 2021-10-02 ENCOUNTER — HEALTH MAINTENANCE LETTER (OUTPATIENT)
Age: 31
End: 2021-10-02

## 2021-11-02 ENCOUNTER — HOSPITAL ENCOUNTER (EMERGENCY)
Facility: CLINIC | Age: 31
Discharge: HOME | End: 2021-11-02
Attending: EMERGENCY MEDICINE | Admitting: EMERGENCY MEDICINE
Payer: COMMERCIAL

## 2021-11-02 VITALS
OXYGEN SATURATION: 99 % | HEART RATE: 98 BPM | RESPIRATION RATE: 18 BRPM | WEIGHT: 175 LBS | SYSTOLIC BLOOD PRESSURE: 135 MMHG | TEMPERATURE: 97.7 F | DIASTOLIC BLOOD PRESSURE: 88 MMHG | BODY MASS INDEX: 30.04 KG/M2

## 2021-11-02 DIAGNOSIS — R11.2 NON-INTRACTABLE VOMITING WITH NAUSEA, UNSPECIFIED VOMITING TYPE: ICD-10-CM

## 2021-11-02 LAB
ALBUMIN SERPL-MCNC: 3.9 G/DL (ref 3.5–5)
ALP SERPL-CCNC: 74 U/L (ref 45–120)
ALT SERPL W P-5'-P-CCNC: 27 U/L (ref 0–45)
ANION GAP SERPL CALCULATED.3IONS-SCNC: 12 MMOL/L (ref 5–18)
AST SERPL W P-5'-P-CCNC: 21 U/L (ref 0–40)
BASOPHILS # BLD AUTO: 0 10E3/UL (ref 0–0.2)
BASOPHILS NFR BLD AUTO: 0 %
BILIRUB SERPL-MCNC: 0.2 MG/DL (ref 0–1)
BUN SERPL-MCNC: 12 MG/DL (ref 8–22)
CALCIUM SERPL-MCNC: 8.9 MG/DL (ref 8.5–10.5)
CHLORIDE BLD-SCNC: 105 MMOL/L (ref 98–107)
CO2 SERPL-SCNC: 20 MMOL/L (ref 22–31)
CREAT SERPL-MCNC: 0.82 MG/DL (ref 0.6–1.1)
EOSINOPHIL # BLD AUTO: 0.2 10E3/UL (ref 0–0.7)
EOSINOPHIL NFR BLD AUTO: 2 %
ERYTHROCYTE [DISTWIDTH] IN BLOOD BY AUTOMATED COUNT: 12.6 % (ref 10–15)
GFR SERPL CREATININE-BSD FRML MDRD: >90 ML/MIN/1.73M2
GLUCOSE BLD-MCNC: 100 MG/DL (ref 70–125)
HCG SERPL QL: NEGATIVE
HCT VFR BLD AUTO: 38.1 % (ref 35–47)
HGB BLD-MCNC: 12.9 G/DL (ref 11.7–15.7)
IMM GRANULOCYTES # BLD: 0 10E3/UL
IMM GRANULOCYTES NFR BLD: 0 %
LIPASE SERPL-CCNC: 42 U/L (ref 0–52)
LYMPHOCYTES # BLD AUTO: 2.2 10E3/UL (ref 0.8–5.3)
LYMPHOCYTES NFR BLD AUTO: 26 %
MCH RBC QN AUTO: 30 PG (ref 26.5–33)
MCHC RBC AUTO-ENTMCNC: 33.9 G/DL (ref 31.5–36.5)
MCV RBC AUTO: 89 FL (ref 78–100)
MONOCYTES # BLD AUTO: 0.6 10E3/UL (ref 0–1.3)
MONOCYTES NFR BLD AUTO: 7 %
NEUTROPHILS # BLD AUTO: 5.5 10E3/UL (ref 1.6–8.3)
NEUTROPHILS NFR BLD AUTO: 65 %
NRBC # BLD AUTO: 0 10E3/UL
NRBC BLD AUTO-RTO: 0 /100
PLATELET # BLD AUTO: 335 10E3/UL (ref 150–450)
POTASSIUM BLD-SCNC: 4.2 MMOL/L (ref 3.5–5)
PROT SERPL-MCNC: 7.3 G/DL (ref 6–8)
RBC # BLD AUTO: 4.3 10E6/UL (ref 3.8–5.2)
SODIUM SERPL-SCNC: 137 MMOL/L (ref 136–145)
WBC # BLD AUTO: 8.5 10E3/UL (ref 4–11)

## 2021-11-02 PROCEDURE — 85025 COMPLETE CBC W/AUTO DIFF WBC: CPT | Performed by: EMERGENCY MEDICINE

## 2021-11-02 PROCEDURE — 99284 EMERGENCY DEPT VISIT MOD MDM: CPT | Mod: 25

## 2021-11-02 PROCEDURE — 84703 CHORIONIC GONADOTROPIN ASSAY: CPT | Performed by: EMERGENCY MEDICINE

## 2021-11-02 PROCEDURE — 36415 COLL VENOUS BLD VENIPUNCTURE: CPT | Performed by: EMERGENCY MEDICINE

## 2021-11-02 PROCEDURE — 83690 ASSAY OF LIPASE: CPT | Performed by: EMERGENCY MEDICINE

## 2021-11-02 PROCEDURE — 258N000003 HC RX IP 258 OP 636: Performed by: EMERGENCY MEDICINE

## 2021-11-02 PROCEDURE — 96361 HYDRATE IV INFUSION ADD-ON: CPT

## 2021-11-02 PROCEDURE — 96374 THER/PROPH/DIAG INJ IV PUSH: CPT

## 2021-11-02 PROCEDURE — 80053 COMPREHEN METABOLIC PANEL: CPT | Performed by: EMERGENCY MEDICINE

## 2021-11-02 PROCEDURE — 250N000011 HC RX IP 250 OP 636: Performed by: EMERGENCY MEDICINE

## 2021-11-02 RX ORDER — ONDANSETRON 2 MG/ML
4 INJECTION INTRAMUSCULAR; INTRAVENOUS ONCE
Status: COMPLETED | OUTPATIENT
Start: 2021-11-02 | End: 2021-11-02

## 2021-11-02 RX ORDER — ONDANSETRON 4 MG/1
4 TABLET, ORALLY DISINTEGRATING ORAL EVERY 6 HOURS PRN
Qty: 21 TABLET | Refills: 0 | Status: SHIPPED | OUTPATIENT
Start: 2021-11-02

## 2021-11-02 RX ORDER — METOCLOPRAMIDE 5 MG/1
5 TABLET ORAL 3 TIMES DAILY PRN
Qty: 21 TABLET | Refills: 0 | Status: SHIPPED | OUTPATIENT
Start: 2021-11-02

## 2021-11-02 RX ADMIN — ONDANSETRON 4 MG: 2 INJECTION INTRAMUSCULAR; INTRAVENOUS at 22:11

## 2021-11-02 RX ADMIN — SODIUM CHLORIDE 1000 ML: 9 INJECTION, SOLUTION INTRAVENOUS at 22:13

## 2021-11-02 ASSESSMENT — ENCOUNTER SYMPTOMS
SHORTNESS OF BREATH: 0
DIARRHEA: 0
JOINT SWELLING: 0
DIZZINESS: 0
VOMITING: 1
COUGH: 0
SORE THROAT: 0
HEMATURIA: 0
CONFUSION: 0
NAUSEA: 1
ABDOMINAL PAIN: 0
FEVER: 0
DYSURIA: 0
CHILLS: 0

## 2021-11-03 NOTE — ED PROVIDER NOTES
"  Emergency Department Encounter     Evaluation Date & Time:   11/2/2021 10:24 PM    CHIEF COMPLAINT:  Vomiting      Triage Note:Pt presents to the ED with c/o nausea and vomiting since yesterday. Pt denies any pain, fevers, or any other sx.         ED COURSE & MEDICAL DECISION MAKING:     ED Course as of Nov 02 2306   Tue Nov 02, 2021   2241 Labs all unremarkable.        Pt here with nausea/vomiting starting yesterday, benign abdomen, afebrile and well appearing.  Pt with labs from triage that are pending, started on IVF, zofran. No indication for imaging at this point.  Will plan for likely discharge with antiemetics, outpatient follow up.  Pt is here with son who was recently sick with a \"cold\", had a negative covid test and improved, but now having right ear pain.  Pt is unvaccinated for covid.    10:28 PM I met with the patient, obtained an initial history, performed an examination and discussed the plan. PPE worn throughout all interactions with the patient, including N95 mask, gown, gloves, face shield. We discussed the plan for discharge and the patient is agreeable. Reviewed supportive cares, symptomatic treatment, outpatient follow up, and reasons to return to the Emergency Department. Patient to be discharged by ED RN.      At the conclusion of the encounter I discussed the results of all the tests and the disposition. The questions were answered. The patient or family acknowledged understanding and was agreeable with the care plan.      MEDICATIONS GIVEN IN THE EMERGENCY DEPARTMENT:  Medications   0.9% sodium chloride BOLUS (0 mLs Intravenous Stopped 11/2/21 2253)   ondansetron (ZOFRAN) injection 4 mg (4 mg Intravenous Given 11/2/21 2211)       NEW PRESCRIPTIONS STARTED AT TODAY'S ED VISIT:  New Prescriptions    METOCLOPRAMIDE (REGLAN) 5 MG TABLET    Take 1 tablet (5 mg) by mouth 3 times daily as needed (nausea)    ONDANSETRON (ZOFRAN-ODT) 4 MG ODT TAB    Take 1 tablet (4 mg) by mouth every 6 hours as " needed for nausea       HPI   HPI     Kristie Rose is a 31 year old female with a pertinent history of methamphetamine use in remission, asthma, who presents to this ED by private car for evaluation of nausea and vomiting.    Patient reports a sudden onset of nausea and vomiting that began yesterday and persisted today. She has not taken anything for her symptoms. Patient notes she feels dehydrated. Her son had a cold 1 week ago but tested negative for COVID, no other known sick contacts. She denies chance of pregnancy, LMP a few weeks ago. She is unvaccinated against COVID. Denies diarrhea, cough, shortness of breath, altered sense of taste or smell, urinary symptoms, or any additional symptoms at this time.     REVIEW OF SYSTEMS:  Review of Systems   Constitutional: Negative for chills and fever.   HENT: Negative for sore throat.         Negative for altered taste or smell.   Eyes: Negative for visual disturbance.   Respiratory: Negative for cough and shortness of breath.    Cardiovascular: Negative for chest pain.   Gastrointestinal: Positive for nausea and vomiting. Negative for abdominal pain and diarrhea.   Endocrine: Negative for polyuria.   Genitourinary: Negative for dysuria and hematuria.        - urinary changes     Musculoskeletal: Negative for joint swelling.   Skin: Negative for rash.   Neurological: Negative for dizziness.   Psychiatric/Behavioral: Negative for confusion.   All other systems reviewed and are negative.            Medical History     Past Medical History:   Diagnosis Date     ADHD (attention deficit hyperactivity disorder) 7/3/2010     Depression with anxiety 12/13/2012     Mental disorder      Tobacco use disorder 11/9/2007     Trauma        Past Surgical History:   Procedure Laterality Date     C/SECTION, LOW TRANSVERSE      x2     WISDOM TOOTH EXTRACTION Bilateral        Family History   Problem Relation Age of Onset     Glaucoma No family hx of      Macular Degeneration No  family hx of        Social History     Tobacco Use     Smoking status: Current Every Day Smoker     Packs/day: 0.25     Years: 10.00     Pack years: 2.50     Smokeless tobacco: Never Used   Substance Use Topics     Alcohol use: No     Drug use: Yes     Types: Methamphetamines     Comment: Drug use: quit smoking meth at 20 weeks       metoclopramide (REGLAN) 5 MG tablet  ondansetron (ZOFRAN-ODT) 4 MG ODT tab  fluorometholone (FML LIQUIFILM) 0.1 % ophthalmic suspension        Physical Exam     Triage Vitals:  ED Triage Vitals [11/02/21 2113]   Enc Vitals Group      /88      Pulse 98      Resp 18      Temp 97.7  F (36.5  C)      Temp src Oral      SpO2 99 %      Weight 79.4 kg (175 lb)      Height       Head Circumference       Peak Flow       Pain Score       Pain Loc       Pain Edu?       Excl. in GC?         Vitals:  /88 (BP Location: Right arm)   Pulse 98   Temp 97.7  F (36.5  C) (Oral)   Resp 18   Wt 79.4 kg (175 lb)   SpO2 99%   BMI 30.04 kg/m      PHYSICAL EXAM:   Physical Exam  Vitals and nursing note reviewed.   Constitutional:       General: She is not in acute distress.     Appearance: Normal appearance.   HENT:      Head: Normocephalic and atraumatic.      Nose: Nose normal.      Mouth/Throat:      Mouth: Mucous membranes are moist.   Eyes:      Pupils: Pupils are equal, round, and reactive to light.   Cardiovascular:      Rate and Rhythm: Normal rate and regular rhythm.      Pulses: Normal pulses.           Radial pulses are 2+ on the right side and 2+ on the left side.        Dorsalis pedis pulses are 2+ on the right side and 2+ on the left side.   Pulmonary:      Effort: Pulmonary effort is normal. No respiratory distress.      Breath sounds: Normal breath sounds.   Abdominal:      Palpations: Abdomen is soft.      Tenderness: There is no abdominal tenderness.   Musculoskeletal:      Cervical back: Full passive range of motion without pain and neck supple.      Comments: No calf  tenderness or swelling b/l   Skin:     General: Skin is warm.      Findings: No rash.   Neurological:      General: No focal deficit present.      Mental Status: She is alert. Mental status is at baseline.      Comments: Fluent speech, no acute lateralizing deficits   Psychiatric:         Mood and Affect: Mood normal.         Behavior: Behavior normal.           Results     LAB:  All pertinent labs reviewed and interpreted  Labs Ordered and Resulted from Time of ED Arrival to Time of ED Departure   COMPREHENSIVE METABOLIC PANEL - Abnormal       Result Value    Sodium 137      Potassium 4.2      Chloride 105      Carbon Dioxide (CO2) 20 (*)     Anion Gap 12      Urea Nitrogen 12      Creatinine 0.82      Calcium 8.9      Glucose 100      Alkaline Phosphatase 74      AST 21      ALT 27      Protein Total 7.3      Albumin 3.9      Bilirubin Total 0.2      GFR Estimate >90     LIPASE - Normal    Lipase 42     HCG QUALITATIVE PREGNANCY - Normal    hCG Serum Qualitative Negative     CBC WITH PLATELETS AND DIFFERENTIAL    WBC Count 8.5      RBC Count 4.30      Hemoglobin 12.9      Hematocrit 38.1      MCV 89      MCH 30.0      MCHC 33.9      RDW 12.6      Platelet Count 335      % Neutrophils 65      % Lymphocytes 26      % Monocytes 7      % Eosinophils 2      % Basophils 0      % Immature Granulocytes 0      NRBCs per 100 WBC 0      Absolute Neutrophils 5.5      Absolute Lymphocytes 2.2      Absolute Monocytes 0.6      Absolute Eosinophils 0.2      Absolute Basophils 0.0      Absolute Immature Granulocytes 0.0      Absolute NRBCs 0.0         RADIOLOGY:  No orders to display         FINAL IMPRESSION:    ICD-10-CM    1. Non-intractable vomiting with nausea, unspecified vomiting type  R11.2        0 minutes of critical care time      IPortia, am serving as a scribe to document services personally performed by Dr. Chapito Blanchard, based on my observations and the provider's statements to me. I, Chapito Blanchard, DO  attest that Portia Bishop is acting in a scribe capacity, has observed my performance of the services and has documented them in accordance with my direction.      Chapito Blanchard,   Emergency Medicine  [facility] EMERGENCY ROOM  11/2/2021  10:27 PM        Chapito Blanchard MD  11/02/21 [id number]       Chapito Blanchard MD  11/02/21 [id number]

## 2021-11-03 NOTE — DISCHARGE INSTRUCTIONS
Take zofran or reglan for nausea. Follow up with primary clinic for re-evaluation.  Return for new/worsening concerns.  Labs today were all reassuring.

## 2021-11-03 NOTE — ED TRIAGE NOTES
Pt presents to the ED with c/o nausea and vomiting since yesterday. Pt denies any pain, fevers, or any other sx.

## 2022-01-13 ENCOUNTER — HOSPITAL ENCOUNTER (EMERGENCY)
Facility: CLINIC | Age: 32
Discharge: HOME | End: 2022-01-13
Attending: EMERGENCY MEDICINE | Admitting: EMERGENCY MEDICINE
Payer: COMMERCIAL

## 2022-01-13 ENCOUNTER — NURSE TRIAGE (OUTPATIENT)
Dept: NURSING | Facility: CLINIC | Age: 32
End: 2022-01-13
Payer: COMMERCIAL

## 2022-01-13 VITALS
RESPIRATION RATE: 16 BRPM | TEMPERATURE: 96.2 F | WEIGHT: 200 LBS | BODY MASS INDEX: 34.33 KG/M2 | DIASTOLIC BLOOD PRESSURE: 76 MMHG | OXYGEN SATURATION: 98 % | SYSTOLIC BLOOD PRESSURE: 115 MMHG | HEART RATE: 98 BPM

## 2022-01-13 DIAGNOSIS — R11.0 NAUSEA: ICD-10-CM

## 2022-01-13 DIAGNOSIS — Z20.822 PERSON UNDER INVESTIGATION FOR COVID-19: ICD-10-CM

## 2022-01-13 PROBLEM — J45.41 MODERATE PERSISTENT ASTHMA WITH EXACERBATION: Status: ACTIVE | Noted: 2021-03-09

## 2022-01-13 PROBLEM — F15.21 METHAMPHETAMINE USE DISORDER, SEVERE, IN SUSTAINED REMISSION (H): Status: ACTIVE | Noted: 2021-06-16

## 2022-01-13 LAB
FLUAV RNA SPEC QL NAA+PROBE: NEGATIVE
FLUBV RNA RESP QL NAA+PROBE: NEGATIVE
SARS-COV-2 RNA RESP QL NAA+PROBE: NEGATIVE

## 2022-01-13 PROCEDURE — 99283 EMERGENCY DEPT VISIT LOW MDM: CPT

## 2022-01-13 PROCEDURE — 87636 SARSCOV2 & INF A&B AMP PRB: CPT | Performed by: PHYSICIAN ASSISTANT

## 2022-01-13 PROCEDURE — C9803 HOPD COVID-19 SPEC COLLECT: HCPCS

## 2022-01-13 RX ORDER — ONDANSETRON 4 MG/1
4 TABLET, FILM COATED ORAL EVERY 8 HOURS PRN
Qty: 10 TABLET | Refills: 0 | Status: SHIPPED | OUTPATIENT
Start: 2022-01-13

## 2022-01-13 ASSESSMENT — ENCOUNTER SYMPTOMS
CHILLS: 0
FEVER: 0
COUGH: 0
NAUSEA: 1
DIARRHEA: 1
SHORTNESS OF BREATH: 0
ABDOMINAL PAIN: 0

## 2022-01-13 NOTE — PROGRESS NOTES
Patient here with her two kids and SO. Patient exposed to COVID from her older son. Patient has been having diarrhea last few days with some abdominal pain. Denies any fever or cough patient is not vaccinated  COVID test pending
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
## 2022-01-13 NOTE — PROGRESS NOTES
Patient presents with mom, dad and brother all have been exposed to COVID from older brother. Patient has been more lethargic was seen on tues for well child check and was placed on abx for a possible double ear infection. Patient was given tylenol at 1230 still feels warm order placed for ib profen. Patient resting in moms arms, cries when MD examining patient. RN will continue to monitor COVID pending

## 2022-01-13 NOTE — DISCHARGE INSTRUCTIONS
Please follow-up with your primary care physician in the next couple of days and follow-up to your emergency department visit.  Take Zofran as needed for nausea.  If you develop increasing abdominal pain worsening symptoms or additional concern please return to the emergency department for repeat assessment.  Your symptoms could be secondary to acute COVID-19 infection please follow-up on your pending test results.  If positive please contact your primary care physician for follow-up.

## 2022-01-13 NOTE — ED TRIAGE NOTES
Pt here with a couple days of nausea and diarrhea. Concerned as her older son was positive for Covid yesterday.

## 2022-04-15 ENCOUNTER — HOSPITAL ENCOUNTER (EMERGENCY)
Facility: CLINIC | Age: 32
Discharge: HOME | End: 2022-04-15
Attending: STUDENT IN AN ORGANIZED HEALTH CARE EDUCATION/TRAINING PROGRAM | Admitting: STUDENT IN AN ORGANIZED HEALTH CARE EDUCATION/TRAINING PROGRAM
Payer: COMMERCIAL

## 2022-04-15 ENCOUNTER — APPOINTMENT (OUTPATIENT)
Dept: CT IMAGING | Facility: CLINIC | Age: 32
End: 2022-04-15
Attending: STUDENT IN AN ORGANIZED HEALTH CARE EDUCATION/TRAINING PROGRAM
Payer: COMMERCIAL

## 2022-04-15 VITALS
DIASTOLIC BLOOD PRESSURE: 81 MMHG | SYSTOLIC BLOOD PRESSURE: 119 MMHG | HEIGHT: 64 IN | RESPIRATION RATE: 16 BRPM | WEIGHT: 200 LBS | BODY MASS INDEX: 34.15 KG/M2 | TEMPERATURE: 98.5 F | OXYGEN SATURATION: 100 % | HEART RATE: 94 BPM

## 2022-04-15 DIAGNOSIS — J03.90 TONSILLITIS: ICD-10-CM

## 2022-04-15 DIAGNOSIS — J36 PERITONSILLAR ABSCESS: ICD-10-CM

## 2022-04-15 LAB
ANION GAP SERPL CALCULATED.3IONS-SCNC: 9 MMOL/L (ref 5–18)
BUN SERPL-MCNC: 16 MG/DL (ref 8–22)
CALCIUM SERPL-MCNC: 8.6 MG/DL (ref 8.5–10.5)
CHLORIDE BLD-SCNC: 105 MMOL/L (ref 98–107)
CO2 SERPL-SCNC: 26 MMOL/L (ref 22–31)
CREAT SERPL-MCNC: 0.77 MG/DL (ref 0.6–1.1)
ERYTHROCYTE [DISTWIDTH] IN BLOOD BY AUTOMATED COUNT: 12.1 % (ref 10–15)
GFR SERPL CREATININE-BSD FRML MDRD: >90 ML/MIN/1.73M2
GLUCOSE BLD-MCNC: 97 MG/DL (ref 70–125)
HCG SERPL QL: NEGATIVE
HCT VFR BLD AUTO: 35 % (ref 35–47)
HGB BLD-MCNC: 12 G/DL (ref 11.7–15.7)
MCH RBC QN AUTO: 30 PG (ref 26.5–33)
MCHC RBC AUTO-ENTMCNC: 34.3 G/DL (ref 31.5–36.5)
MCV RBC AUTO: 88 FL (ref 78–100)
PLATELET # BLD AUTO: 306 10E3/UL (ref 150–450)
POTASSIUM BLD-SCNC: 3.6 MMOL/L (ref 3.5–5)
RBC # BLD AUTO: 4 10E6/UL (ref 3.8–5.2)
SODIUM SERPL-SCNC: 140 MMOL/L (ref 136–145)
WBC # BLD AUTO: 6.9 10E3/UL (ref 4–11)

## 2022-04-15 PROCEDURE — 96374 THER/PROPH/DIAG INJ IV PUSH: CPT | Mod: 59

## 2022-04-15 PROCEDURE — 36415 COLL VENOUS BLD VENIPUNCTURE: CPT | Performed by: STUDENT IN AN ORGANIZED HEALTH CARE EDUCATION/TRAINING PROGRAM

## 2022-04-15 PROCEDURE — 42700 I&D ABSCESS PERITONSILLAR: CPT

## 2022-04-15 PROCEDURE — 96375 TX/PRO/DX INJ NEW DRUG ADDON: CPT

## 2022-04-15 PROCEDURE — 85014 HEMATOCRIT: CPT | Performed by: STUDENT IN AN ORGANIZED HEALTH CARE EDUCATION/TRAINING PROGRAM

## 2022-04-15 PROCEDURE — 250N000011 HC RX IP 250 OP 636: Performed by: STUDENT IN AN ORGANIZED HEALTH CARE EDUCATION/TRAINING PROGRAM

## 2022-04-15 PROCEDURE — 258N000003 HC RX IP 258 OP 636: Performed by: STUDENT IN AN ORGANIZED HEALTH CARE EDUCATION/TRAINING PROGRAM

## 2022-04-15 PROCEDURE — 99285 EMERGENCY DEPT VISIT HI MDM: CPT | Mod: 25

## 2022-04-15 PROCEDURE — 80048 BASIC METABOLIC PNL TOTAL CA: CPT | Performed by: STUDENT IN AN ORGANIZED HEALTH CARE EDUCATION/TRAINING PROGRAM

## 2022-04-15 PROCEDURE — 84703 CHORIONIC GONADOTROPIN ASSAY: CPT | Performed by: STUDENT IN AN ORGANIZED HEALTH CARE EDUCATION/TRAINING PROGRAM

## 2022-04-15 PROCEDURE — 250N000009 HC RX 250: Performed by: STUDENT IN AN ORGANIZED HEALTH CARE EDUCATION/TRAINING PROGRAM

## 2022-04-15 PROCEDURE — 70491 CT SOFT TISSUE NECK W/DYE: CPT

## 2022-04-15 PROCEDURE — 96361 HYDRATE IV INFUSION ADD-ON: CPT

## 2022-04-15 RX ORDER — DEXAMETHASONE SODIUM PHOSPHATE 10 MG/ML
10 INJECTION, SOLUTION INTRAMUSCULAR; INTRAVENOUS ONCE
Status: COMPLETED | OUTPATIENT
Start: 2022-04-15 | End: 2022-04-15

## 2022-04-15 RX ORDER — IOPAMIDOL 755 MG/ML
100 INJECTION, SOLUTION INTRAVASCULAR ONCE
Status: COMPLETED | OUTPATIENT
Start: 2022-04-15 | End: 2022-04-15

## 2022-04-15 RX ORDER — KETOROLAC TROMETHAMINE 15 MG/ML
15 INJECTION, SOLUTION INTRAMUSCULAR; INTRAVENOUS ONCE
Status: COMPLETED | OUTPATIENT
Start: 2022-04-15 | End: 2022-04-15

## 2022-04-15 RX ADMIN — DEXAMETHASONE SODIUM PHOSPHATE 10 MG: 10 INJECTION, SOLUTION INTRAMUSCULAR; INTRAVENOUS at 05:46

## 2022-04-15 RX ADMIN — IOPAMIDOL 100 ML: 755 INJECTION, SOLUTION INTRAVENOUS at 06:20

## 2022-04-15 RX ADMIN — KETOROLAC TROMETHAMINE 15 MG: 15 INJECTION, SOLUTION INTRAMUSCULAR; INTRAVENOUS at 05:46

## 2022-04-15 RX ADMIN — SODIUM CHLORIDE 1000 ML: 9 INJECTION, SOLUTION INTRAVENOUS at 05:47

## 2022-04-15 RX ADMIN — BENZOCAINE 2.5 ML: 220 SPRAY, METERED PERIODONTAL at 06:52

## 2022-04-15 ASSESSMENT — ENCOUNTER SYMPTOMS
NAUSEA: 0
FEVER: 0
SORE THROAT: 1
VOMITING: 0
COUGH: 1
DYSURIA: 0
DIFFICULTY URINATING: 0
RHINORRHEA: 1
FREQUENCY: 0
TROUBLE SWALLOWING: 1
HEMATURIA: 0
ABDOMINAL PAIN: 0

## 2022-04-15 NOTE — DISCHARGE INSTRUCTIONS
Please take the antibiotics as prescribed  You can take 650 mg of tylenol every 6-8 hours (no more than 3000 mg in 24 hours).  You can take 400 mg ofibuprofen with food every 6-8 hours (no more than 3200 mg in 24 hours).   If needed you can alternate between the two (take tylenol, then 3 hours later take a dose of ibuprofen, then 3 hours later take a dose of tylenol,etc.)    Follow up closely with ENT  Return to the Emergency Department if you cannot keep down saliva, foods/fluids, have difficulty breathing or other worsening symptoms
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
## 2022-04-15 NOTE — ED TRIAGE NOTES
Sore throat and cold symptoms x4 days. Dx with tonsilitis two days ago, negative strep. Took steroids with relief but now feels she is having difficulty swallowing and managing secretions. Voice is clear, breathing easy.

## 2022-05-14 ENCOUNTER — HEALTH MAINTENANCE LETTER (OUTPATIENT)
Age: 32
End: 2022-05-14

## 2023-01-14 ENCOUNTER — HEALTH MAINTENANCE LETTER (OUTPATIENT)
Age: 33
End: 2023-01-14

## 2023-06-02 ENCOUNTER — HEALTH MAINTENANCE LETTER (OUTPATIENT)
Age: 33
End: 2023-06-02

## 2024-05-03 ENCOUNTER — NURSE TRIAGE (OUTPATIENT)
Dept: NURSING | Facility: CLINIC | Age: 34
End: 2024-05-03
Payer: COMMERCIAL

## 2024-05-03 ENCOUNTER — HOSPITAL ENCOUNTER (EMERGENCY)
Facility: CLINIC | Age: 34
Discharge: HOME | End: 2024-05-03
Attending: EMERGENCY MEDICINE | Admitting: EMERGENCY MEDICINE
Payer: COMMERCIAL

## 2024-05-03 ENCOUNTER — APPOINTMENT (OUTPATIENT)
Dept: CT IMAGING | Facility: CLINIC | Age: 34
End: 2024-05-03
Attending: EMERGENCY MEDICINE
Payer: COMMERCIAL

## 2024-05-03 VITALS
SYSTOLIC BLOOD PRESSURE: 118 MMHG | RESPIRATION RATE: 16 BRPM | WEIGHT: 200 LBS | OXYGEN SATURATION: 98 % | HEART RATE: 104 BPM | HEIGHT: 64 IN | DIASTOLIC BLOOD PRESSURE: 59 MMHG | BODY MASS INDEX: 34.15 KG/M2 | TEMPERATURE: 98.3 F
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
## 2024-05-03 DIAGNOSIS — S09.90XA INJURY OF HEAD, INITIAL ENCOUNTER: ICD-10-CM

## 2024-05-03 PROCEDURE — 99284 EMERGENCY DEPT VISIT MOD MDM: CPT | Mod: 25

## 2024-05-03 PROCEDURE — 70450 CT HEAD/BRAIN W/O DYE: CPT

## 2024-05-03 ASSESSMENT — ACTIVITIES OF DAILY LIVING (ADL): ADLS_ACUITY_SCORE: 35

## 2024-05-03 ASSESSMENT — COLUMBIA-SUICIDE SEVERITY RATING SCALE - C-SSRS
6. HAVE YOU EVER DONE ANYTHING, STARTED TO DO ANYTHING, OR PREPARED TO DO ANYTHING TO END YOUR LIFE?: NO
1. IN THE PAST MONTH, HAVE YOU WISHED YOU WERE DEAD OR WISHED YOU COULD GO TO SLEEP AND NOT WAKE UP?: NO
2. HAVE YOU ACTUALLY HAD ANY THOUGHTS OF KILLING YOURSELF IN THE PAST MONTH?: NO
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
## 2024-05-03 NOTE — DISCHARGE INSTRUCTIONS
Fortunately your CAT scan today was normal.  Follow-up with your primary care doctor and return to the ER for any worsening symptoms or other concerns.

## 2024-07-06 ENCOUNTER — HEALTH MAINTENANCE LETTER (OUTPATIENT)
Age: 34
End: 2024-07-06

## 2024-07-31 ENCOUNTER — HOSPITAL ENCOUNTER (EMERGENCY)
Facility: CLINIC | Age: 34
Discharge: HOME | End: 2024-07-31
Admitting: PHYSICIAN ASSISTANT
Payer: COMMERCIAL

## 2024-07-31 VITALS
SYSTOLIC BLOOD PRESSURE: 121 MMHG | RESPIRATION RATE: 19 BRPM | OXYGEN SATURATION: 98 % | DIASTOLIC BLOOD PRESSURE: 65 MMHG | BODY MASS INDEX: 34.33 KG/M2 | HEART RATE: 120 BPM | TEMPERATURE: 98.3 F | WEIGHT: 200 LBS

## 2024-07-31 DIAGNOSIS — N61.0 MASTITIS: ICD-10-CM

## 2024-07-31 PROCEDURE — 99283 EMERGENCY DEPT VISIT LOW MDM: CPT

## 2024-07-31 RX ORDER — DICLOXACILLIN SODIUM 500 MG
500 CAPSULE ORAL 4 TIMES DAILY
Qty: 40 CAPSULE | Refills: 0 | Status: SHIPPED | OUTPATIENT
Start: 2024-07-31 | End: 2024-08-10

## 2024-07-31 ASSESSMENT — COLUMBIA-SUICIDE SEVERITY RATING SCALE - C-SSRS
1. IN THE PAST MONTH, HAVE YOU WISHED YOU WERE DEAD OR WISHED YOU COULD GO TO SLEEP AND NOT WAKE UP?: NO
2. HAVE YOU ACTUALLY HAD ANY THOUGHTS OF KILLING YOURSELF IN THE PAST MONTH?: NO
6. HAVE YOU EVER DONE ANYTHING, STARTED TO DO ANYTHING, OR PREPARED TO DO ANYTHING TO END YOUR LIFE?: NO

## 2024-08-01 NOTE — ED PROVIDER NOTES
EMERGENCY DEPARTMENT ENCOUNTER   NAME: Kristie Rose ; AGE: 34 year old female ; YOB: 1990 ; MRN: [id number] ; PCP: Robin Granados     Evaluation Date & Time: No admission date for patient encounter.    ED Provider: Mercedes Rodriguez PA-C    CHIEF COMPLAINT     Breast Pain      FINAL ASSESSMENT       ICD-10-CM    1. Mastitis  N61.0           ED COURSE, MEDICAL DECISION MAKING, PLAN     ED course     9:16 PM I met with the patient to obtain patient history and performed a physical exam. Discussed plan for ED work up including potential diagnostic studies and interventions.   9:55 PM Reevaluated and updated the patient with results. We discussed the plan for discharge and the patient is agreeable. Reviewed supportive cares, symptomatic treatment, outpatient follow up, and reasons to return to the Emergency Department. Patient to be discharged by ED RN.    ______________________________________________________________________    Reason for visit: Kristie Rose is a 34 year old female with past medical history of s/p  C/Section, genital herpes, and   presenting for left breast pain that started about 2 days ago. Feels like when she has had plugged ducts in the past. Reports a fever and chills. Stopped pumping about 4 weeks ago.     Exam: Palpable pain to the left upper breast. No palpable masses. No skin redness. No increased tissue warmth. She istachycardic to 138, but otherwise vitally normal.     Differentials: Plugged milk duct, mastitis, cellulitis, abscess.     Labs: N/A    EKG: N/A    Imaging: N/A    Consults: N/A    Interventions/recheck: Vital recheck reveals pulse rate down to 120. Pt denies any chest pain, palpitations, shortness of breath. States her usual pulse rate is around 100. Offered EKG and fluids and recheck, but she declined and would like to discharge and monitor.     Assessment: Left breast pain without clear etiology. Most likely to be a plugged duct. I am  unable to feel any masses, no skin redness, no increased tissue warmth so unlikely to be an abscess or cellulitis.   Overall, no red flag s/s present to suggest an acutely serious or life threatening condition that would necessitate hospitalization.     Plan: Will have patient do warm compresses. Given her report of fever will provide her with a course of Dicloxacillin out of an abundance of precaution. She can continue with Tylenol and Ibuprofen for pain/fevers as needed.   Indications for re-evaluation in the ER discussed.   Patient/parent/guardian understanding and agreeable with the plan and will discharge to home in good condition.       *All pertinent lab & imaging studies independently reviewed. (See chart for details)   *Discussed the results of all the tests and plan with patient and family/guardians.   ______________________________________________________________________      HISTORY OF PRESENT ILLNESS   Patient information was obtained from: patient    Use of Intrepreter: N/A     Kristie Rose is a 34 year old female with past medical history of s/p  C/Section, genital herpes, and  who presents to the ED by private car for evaluation of pain in breast.    Patient states onset of left breast pain 2 days ago. She has not breast fed for almost 10 months. Each morning she has clear milk leak out of her left breast. She reports it feels swollen and hot to the touch. Started to feel feverish today after a nap, took tylenol 2 hours ago, and that has reduced the fever.     Patient denies pain over her nipple. There were no other complaints/concerns at this time.     Per chart review: 2024 patient called [facility] triage line for evaluation of breast pain. Patient stopped pumping 4 weeks ago. Denies history of breast problems. Patient was discharged in stable condition.       MEDICAL HISTORY     Past Medical History:   Diagnosis Date    ADHD (attention deficit hyperactivity  disorder) 7/3/2010    Depression with anxiety 12/13/2012    Mental disorder     Tobacco use disorder 11/9/2007    Trauma        Past Surgical History:   Procedure Laterality Date    C/SECTION, LOW TRANSVERSE      x2    WISDOM TOOTH EXTRACTION Bilateral        Family History   Problem Relation Age of Onset    Glaucoma No family hx of     Macular Degeneration No family hx of        Social History     Tobacco Use    Smoking status: Every Day     Current packs/day: 0.25     Average packs/day: 0.3 packs/day for 10.0 years (2.5 ttl pk-yrs)     Types: Cigarettes    Smokeless tobacco: Never   Substance Use Topics    Alcohol use: No    Drug use: Yes     Types: Methamphetamines     Comment: Drug use: quit smoking meth at 20 weeks       dicloxacillin (DYNAPEN) 500 MG capsule  fluorometholone (FML LIQUIFILM) 0.1 % ophthalmic suspension  metoclopramide (REGLAN) 5 MG tablet  ondansetron (ZOFRAN) 4 MG tablet  ondansetron (ZOFRAN-ODT) 4 MG ODT tab          PHYSICAL EXAM     First Vitals:  Patient Vitals for the past 24 hrs:   BP Temp Temp src Pulse Resp SpO2 Weight   07/31/24 2154 -- -- -- 120 -- -- --   07/31/24 2119 121/65 98.3  F (36.8  C) Oral (!) 138 19 98 % 90.7 kg (200 lb)         PHYSICAL EXAM:   Constitutional: No acute distress.  Neuro: Awake and alert. No focal deficits.  Psych: Calm and cooperative.  Eyes: PERRL. EOMI. Conjunctivae clear.   Chest: Palpable pain to the upper left breast without palpable masses, skin color changes, or increased tissue warmth. No nipple discharge.   Cardio: . Adequate perfusion to extremities. Regular rhythm. No murmurs.  Pulmonary: Oxygenating well on RA. No labored breathing. CTA b/l.  Skin: Natural color, warm, dry, intact.       RESULTS     LAB:  All pertinent labs reviewed and interpreted  Labs Ordered and Resulted from Time of ED Arrival to Time of ED Departure - No data to display    RADIOLOGY:  No orders to display       ECG:    N/A      PROCEDURES     None           MEDICAL  DECISION MAKING:  Obtained supplemental history:Supplemental history obtained?: No  Reviewed external records: External records reviewed?: [place] [facility] [place]  Care impacted by chronic illness:N/A  Care significantly affected by social determinants of health:Access to Medical Care  Did you consider but not order tests?: Work up considered but not performed and documented in chart, if applicable  Did you interpret images independently?: Independent interpretation of ECG and images noted in documentation, when applicable.  Consultation discussion with other provider:Did you involve another provider (consultant, , pharmacy, etc.)?: No  Discharge. I prescribed additional prescription strength medication(s) as charted. See documentation for any additional details.      FINAL IMPRESSION:    ICD-10-CM    1. Mastitis  N61.0             MEDICATIONS GIVEN IN THE EMERGENCY DEPARTMENT:  Medications - No data to display      NEW PRESCRIPTIONS STARTED AT TODAY'S ED VISIT:  New Prescriptions    DICLOXACILLIN (DYNAPEN) 500 MG CAPSULE    Take 1 capsule (500 mg) by mouth 4 times daily for 10 days            I, Patel Stern, am serving as a scribe to document services personally performed by Mercedes Rodriguez PA-C, based on my observation and the provider's statements to me. IMercedes PA-C attest that Patel Stern is acting in a scribe capacity, has observed my performance of the services and has documented them in accordance with my direction.     Some or all of this documentation has been completed using dictation software and mild grammatical errors may be present. Please contact me with any concerns regarding this.       Mercedes Rodriguez PA-C  Emergency Medicine   [facility] EMERGENCY ROOM       Mercedes Rodriguez PA-C  08/02/24 1212

## 2024-08-01 NOTE — ED TRIAGE NOTES
Patient presents to the ED with left breast tenderness/warmness that started yesterday. Unknown fevers at home but states that she has felt warm. Patient has a history of clogged ducts. Patient has not breast fed her child in 10 months.

## 2024-08-01 NOTE — DISCHARGE INSTRUCTIONS
Take the antibiotic for the full course as prescribed.  Return to the ER for any new or worsening symptoms.

## 2024-10-13 ENCOUNTER — HOSPITAL ENCOUNTER (EMERGENCY)
Facility: CLINIC | Age: 34
Discharge: HOME | End: 2024-10-14
Attending: STUDENT IN AN ORGANIZED HEALTH CARE EDUCATION/TRAINING PROGRAM | Admitting: STUDENT IN AN ORGANIZED HEALTH CARE EDUCATION/TRAINING PROGRAM
Payer: COMMERCIAL

## 2024-10-13 DIAGNOSIS — J02.0 STREP PHARYNGITIS: ICD-10-CM

## 2024-10-13 LAB
ALBUMIN SERPL BCG-MCNC: 4.2 G/DL (ref 3.5–5.2)
ALP SERPL-CCNC: 82 U/L (ref 40–150)
ALT SERPL W P-5'-P-CCNC: 25 U/L (ref 0–50)
ANION GAP SERPL CALCULATED.3IONS-SCNC: 12 MMOL/L (ref 7–15)
AST SERPL W P-5'-P-CCNC: 25 U/L (ref 0–45)
BASOPHILS # BLD MANUAL: 0 10E3/UL (ref 0–0.2)
BASOPHILS NFR BLD MANUAL: 0 %
BILIRUB SERPL-MCNC: 0.3 MG/DL
BUN SERPL-MCNC: 8.4 MG/DL (ref 6–20)
CALCIUM SERPL-MCNC: 9.3 MG/DL (ref 8.8–10.4)
CHLORIDE SERPL-SCNC: 100 MMOL/L (ref 98–107)
CREAT SERPL-MCNC: 0.84 MG/DL (ref 0.51–0.95)
EGFRCR SERPLBLD CKD-EPI 2021: >90 ML/MIN/1.73M2
EOSINOPHIL # BLD MANUAL: 0 10E3/UL (ref 0–0.7)
EOSINOPHIL NFR BLD MANUAL: 0 %
ERYTHROCYTE [DISTWIDTH] IN BLOOD BY AUTOMATED COUNT: 12.1 % (ref 10–15)
GLUCOSE SERPL-MCNC: 111 MG/DL (ref 70–99)
GROUP A STREP BY PCR: DETECTED
HCO3 SERPL-SCNC: 24 MMOL/L (ref 22–29)
HCT VFR BLD AUTO: 40.9 % (ref 35–47)
HGB BLD-MCNC: 14.2 G/DL (ref 11.7–15.7)
LYMPHOCYTES # BLD MANUAL: 0.8 10E3/UL (ref 0.8–5.3)
LYMPHOCYTES NFR BLD MANUAL: 5 %
MCH RBC QN AUTO: 30.2 PG (ref 26.5–33)
MCHC RBC AUTO-ENTMCNC: 34.7 G/DL (ref 31.5–36.5)
MCV RBC AUTO: 87 FL (ref 78–100)
MONOCYTES # BLD MANUAL: 0.3 10E3/UL (ref 0–1.3)
MONOCYTES NFR BLD AUTO: NEGATIVE %
MONOCYTES NFR BLD MANUAL: 2 %
NEUTROPHILS # BLD MANUAL: 15.5 10E3/UL (ref 1.6–8.3)
NEUTROPHILS NFR BLD MANUAL: 93 %
PLAT MORPH BLD: ABNORMAL
PLATELET # BLD AUTO: 283 10E3/UL (ref 150–450)
POTASSIUM SERPL-SCNC: 3.8 MMOL/L (ref 3.4–5.3)
PROT SERPL-MCNC: 7.6 G/DL (ref 6.4–8.3)
RBC # BLD AUTO: 4.7 10E6/UL (ref 3.8–5.2)
RBC MORPH BLD: ABNORMAL
SODIUM SERPL-SCNC: 136 MMOL/L (ref 135–145)
WBC # BLD AUTO: 16.7 10E3/UL (ref 4–11)

## 2024-10-13 PROCEDURE — 36415 COLL VENOUS BLD VENIPUNCTURE: CPT | Performed by: STUDENT IN AN ORGANIZED HEALTH CARE EDUCATION/TRAINING PROGRAM

## 2024-10-13 PROCEDURE — 250N000011 HC RX IP 250 OP 636: Performed by: STUDENT IN AN ORGANIZED HEALTH CARE EDUCATION/TRAINING PROGRAM

## 2024-10-13 PROCEDURE — 82040 ASSAY OF SERUM ALBUMIN: CPT | Performed by: STUDENT IN AN ORGANIZED HEALTH CARE EDUCATION/TRAINING PROGRAM

## 2024-10-13 PROCEDURE — 86308 HETEROPHILE ANTIBODY SCREEN: CPT | Performed by: STUDENT IN AN ORGANIZED HEALTH CARE EDUCATION/TRAINING PROGRAM

## 2024-10-13 PROCEDURE — 85027 COMPLETE CBC AUTOMATED: CPT | Performed by: STUDENT IN AN ORGANIZED HEALTH CARE EDUCATION/TRAINING PROGRAM

## 2024-10-13 PROCEDURE — 85007 BL SMEAR W/DIFF WBC COUNT: CPT | Performed by: STUDENT IN AN ORGANIZED HEALTH CARE EDUCATION/TRAINING PROGRAM

## 2024-10-13 PROCEDURE — 96365 THER/PROPH/DIAG IV INF INIT: CPT

## 2024-10-13 PROCEDURE — 99284 EMERGENCY DEPT VISIT MOD MDM: CPT | Mod: 25

## 2024-10-13 PROCEDURE — 258N000003 HC RX IP 258 OP 636: Performed by: STUDENT IN AN ORGANIZED HEALTH CARE EDUCATION/TRAINING PROGRAM

## 2024-10-13 PROCEDURE — 87651 STREP A DNA AMP PROBE: CPT | Performed by: EMERGENCY MEDICINE

## 2024-10-13 PROCEDURE — 250N000013 HC RX MED GY IP 250 OP 250 PS 637: Performed by: STUDENT IN AN ORGANIZED HEALTH CARE EDUCATION/TRAINING PROGRAM

## 2024-10-13 PROCEDURE — 96375 TX/PRO/DX INJ NEW DRUG ADDON: CPT

## 2024-10-13 PROCEDURE — 96361 HYDRATE IV INFUSION ADD-ON: CPT

## 2024-10-13 RX ORDER — DIPHENHYDRAMINE HYDROCHLORIDE AND LIDOCAINE HYDROCHLORIDE AND ALUMINUM HYDROXIDE AND MAGNESIUM HYDRO
10 KIT ONCE
Status: COMPLETED | OUTPATIENT
Start: 2024-10-13 | End: 2024-10-13

## 2024-10-13 RX ORDER — KETOROLAC TROMETHAMINE 15 MG/ML
15 INJECTION, SOLUTION INTRAMUSCULAR; INTRAVENOUS ONCE
Status: COMPLETED | OUTPATIENT
Start: 2024-10-13 | End: 2024-10-13

## 2024-10-13 RX ORDER — AMPICILLIN AND SULBACTAM 2; 1 G/1; G/1
3 INJECTION, POWDER, FOR SOLUTION INTRAMUSCULAR; INTRAVENOUS ONCE
Status: COMPLETED | OUTPATIENT
Start: 2024-10-13 | End: 2024-10-13

## 2024-10-13 RX ORDER — DEXAMETHASONE SODIUM PHOSPHATE 10 MG/ML
10 INJECTION, SOLUTION INTRAMUSCULAR; INTRAVENOUS ONCE
Status: COMPLETED | OUTPATIENT
Start: 2024-10-13 | End: 2024-10-13

## 2024-10-13 RX ADMIN — KETOROLAC TROMETHAMINE 15 MG: 15 INJECTION, SOLUTION INTRAMUSCULAR; INTRAVENOUS at 22:11

## 2024-10-13 RX ADMIN — DEXAMETHASONE SODIUM PHOSPHATE 10 MG: 10 INJECTION INTRAMUSCULAR; INTRAVENOUS at 22:11

## 2024-10-13 RX ADMIN — SODIUM CHLORIDE, POTASSIUM CHLORIDE, SODIUM LACTATE AND CALCIUM CHLORIDE 1000 ML: 600; 310; 30; 20 INJECTION, SOLUTION INTRAVENOUS at 22:10

## 2024-10-13 RX ADMIN — AMPICILLIN SODIUM AND SULBACTAM SODIUM 3 G: 2; 1 INJECTION, POWDER, FOR SOLUTION INTRAMUSCULAR; INTRAVENOUS at 23:36

## 2024-10-13 RX ADMIN — DIPHENHYDRAMINE HYDROCHLORIDE AND LIDOCAINE HYDROCHLORIDE AND ALUMINUM HYDROXIDE AND MAGNESIUM HYDRO 10 ML: KIT at 23:35

## 2024-10-13 ASSESSMENT — ACTIVITIES OF DAILY LIVING (ADL)
ADLS_ACUITY_SCORE: 35
ADLS_ACUITY_SCORE: 35

## 2024-10-13 NOTE — Clinical Note
Kristie Rose was seen and treated in our emergency department on 10/13/2024.  She may return to work on 10/18/2024.       If you have any questions or concerns, please don't hesitate to call.      Ohl, Raad Giron, DO

## 2024-10-14 VITALS
WEIGHT: 200 LBS | DIASTOLIC BLOOD PRESSURE: 71 MMHG | BODY MASS INDEX: 34.33 KG/M2 | RESPIRATION RATE: 26 BRPM | TEMPERATURE: 99.4 F | OXYGEN SATURATION: 92 % | SYSTOLIC BLOOD PRESSURE: 135 MMHG | HEART RATE: 117 BPM

## 2024-10-14 NOTE — ED NOTES
"Pt reports \"I feel a lot better now;\" ambulated independently to the bathroom. HR now that she is back and settled into bed is around 108. MD aware and will continue to monitor.  "

## 2024-10-14 NOTE — ED PROVIDER NOTES
Emergency Department Encounter         FINAL IMPRESSION:  Strep pharyngitis         ED COURSE AND MEDICAL DECISION MAKING        Patient is a 34-year-old obese female here with pharyngitis for the last 2 to 3 days.  Increased fevers and cough.  No neck stiffness or headache.  No vomiting.  Patient had a PT a few years ago and patient reports this feels similar.  No chest pain.    Arrival she looks well.  Tachycardic most likely febrile.  Oral examination feeling bilateral swollen tonsils with no asymmetry.  Uvula midline.  No stridor drooling or trismus and patient otherwise tolerating her secretions.  Full range of motion of the neck.  Normal heart and lungs other than being tachycardic.  Abdomen benign.    - Patient's labs suggestive of infection.  Mono negative.  Patient with significant improvement regarding her symptomatology, was given Unasyn, Decadron, Toradol, fluids and Magic mouthwash.  - Sent home with Augmentin and outpatient follow-up.    Additional ED Course Timeline:  9:55 PM I met with the patient, obtained history, performed an initial exam, and discussed options and plan for diagnostics and treatment here in the ED.                        Medical Decision Making  Obtained supplemental history:Supplemental history obtained?: No  Reviewed external records: External records reviewed?: Documented in chart  Care impacted by chronic illness:Mental Health and Smoking / Nicotine Use  Did you consider but not order tests?: Work up considered but not performed and documented in chart, if applicable  Did you interpret images independently?: Independent interpretation of ECG and images noted in documentation, when applicable.  Consultation discussion with other provider:Did you involve another provider (consultant, MH, pharmacy, etc.)?: No  Discharge. I prescribed additional prescription strength medication(s) as charted. See documentation for any additional details.    MIPS: Not  Applicable                        MEDICATIONS GIVEN IN THE EMERGENCY DEPARTMENT:  Medications   ketorolac (TORADOL) injection 15 mg (has no administration in time range)   lactated ringers BOLUS 1,000 mL (has no administration in time range)   dexAMETHasone PF (DECADRON) injection 10 mg (has no administration in time range)   magic mouthwash suspension (diphenhydramine, lidocaine, aluminum-magnesium & simethicone) (has no administration in time range)       NEW PRESCRIPTIONS STARTED AT TODAY'S ED VISIT:  New Prescriptions    No medications on file       HPI     Patient information obtained from: The patient    Use of : N/A     Kristie Rose is a 34 year old female with a pertinent history of depression with anxiety, tobacco use disorder, who presents to this ED via walk-in for evaluation of pharyngitis.  Reports 2 to 3 days of worsening pain.  Increased fevers and chills.  No vomiting.  Mild nausea.            REVIEW OF SYSTEMS:  See HPI          MEDICAL HISTORY     Past Medical History:   Diagnosis Date    ADHD (attention deficit hyperactivity disorder) 7/3/2010    Depression with anxiety 12/13/2012    Mental disorder     Tobacco use disorder 11/9/2007    Trauma        Past Surgical History:   Procedure Laterality Date    C/SECTION, LOW TRANSVERSE      x2    WISDOM TOOTH EXTRACTION Bilateral        Social History     Tobacco Use    Smoking status: Every Day     Current packs/day: 0.25     Average packs/day: 0.3 packs/day for 10.0 years (2.5 ttl pk-yrs)     Types: Cigarettes    Smokeless tobacco: Never   Substance Use Topics    Alcohol use: No    Drug use: Yes     Types: Methamphetamines     Comment: Drug use: quit smoking meth at 20 weeks       fluorometholone (FML LIQUIFILM) 0.1 % ophthalmic suspension  metoclopramide (REGLAN) 5 MG tablet  ondansetron (ZOFRAN) 4 MG tablet  ondansetron (ZOFRAN-ODT) 4 MG ODT tab            PHYSICAL EXAM     /82   Pulse 113   Temp 99.4  F (37.4  C) (Oral)    Resp 26   Wt 90.7 kg (200 lb)   LMP 10/01/2024 (Approximate)   SpO2 93%   BMI 34.33 kg/m        PHYSICAL EXAM:     General: Patient appears well, nontoxic, comfortable  HEENT: Moist mucous membranes,  No head trauma.    Cardiovascular: Normal rate, normal rhythm, no extremity edema.  No appreciable murmur.  Respiratory: No signs of respiratory distress, lungs are clear to auscultation bilaterally with no wheezes rhonchi or rales.  Abdominal: Soft, nontender, nondistended, no palpable masses, no guarding, no rebound  Musculoskeletal: Full range of motion of joints, no deformities appreciated.  Neurological: Alert and oriented, grossly neurologically intact.  Psychological: Normal affect and mood.  Integument: No rashes appreciated          RESULTS       Labs Ordered and Resulted from Time of ED Arrival to Time of ED Departure   GROUP A STREPTOCOCCUS PCR THROAT SWAB - Abnormal       Result Value    Group A strep by PCR Detected (*)    COMPREHENSIVE METABOLIC PANEL   MONONUCLEOSIS SCREEN   INFLUENZA A/B, RSV, & SARS-COV2 PCR       No orders to display                     PROCEDURES:  Procedures:  Procedures       IFadi am serving as a scribe to document services personally performed by Raad Schulte DO, based on my observations and the provider's statements to me.  I, Raad Schulte DO, attest that Fadi Gallardo is acting in a scribe capacity, has observed my performance of the services and has documented them in accordance with my direction.    Raad Schulte DO  Emergency Medicine  [facility] EMERGENCY ROOM       Raad Schulte DO  10/14/24 0011

## 2024-10-14 NOTE — ED TRIAGE NOTES
Pt presents with complaints with complaints of swallowing difficulty and cold sweats. Symptoms began last night. Children are also sick but unsure what they are sick with. Unsure exactly when took ibuprofen last but within 6hr and acetaminophen within 4hr.

## 2024-10-14 NOTE — DISCHARGE INSTRUCTIONS
Your strep swab today was positive.  You were given antibiotics through the IV.  Please continue the oral antibiotics given.    Please take 500mg of tylenol every 4 hours as well as 600mg of ibuprofen every 6 hours for pain. Do not take more than 4,000mg of tylenol in 24hrs.    Return for any worsening symptoms

## 2024-10-14 NOTE — ED NOTES
Pt is A&O, she is agreeable with plan to discharge on abx; ambulatory and vitally stable at discharge.

## 2024-10-14 NOTE — ED NOTES
Pt informed of positive strep result; in light of that she prefers to decline covid swab at this time. Tolerated IV placement and IVF bolus is going well. Lights dimmed for pt comfort; call light is within reach.

## 2025-06-27 ENCOUNTER — HOSPITAL ENCOUNTER (EMERGENCY)
Facility: CLINIC | Age: 35
Discharge: HOME | End: 2025-06-27
Attending: EMERGENCY MEDICINE | Admitting: EMERGENCY MEDICINE
Payer: COMMERCIAL

## 2025-06-27 VITALS
OXYGEN SATURATION: 98 % | HEART RATE: 119 BPM | WEIGHT: 200 LBS | DIASTOLIC BLOOD PRESSURE: 80 MMHG | TEMPERATURE: 98.1 F | RESPIRATION RATE: 18 BRPM | HEIGHT: 64 IN | BODY MASS INDEX: 34.15 KG/M2 | SYSTOLIC BLOOD PRESSURE: 125 MMHG

## 2025-06-27 DIAGNOSIS — S61.313A LACERATION OF LEFT MIDDLE FINGER WITHOUT FOREIGN BODY WITH DAMAGE TO NAIL, INITIAL ENCOUNTER: ICD-10-CM

## 2025-06-27 PROCEDURE — 12002 RPR S/N/AX/GEN/TRNK2.6-7.5CM: CPT

## 2025-06-27 PROCEDURE — 99283 EMERGENCY DEPT VISIT LOW MDM: CPT

## 2025-06-27 RX ORDER — CEPHALEXIN 500 MG/1
500 CAPSULE ORAL 2 TIMES DAILY
Qty: 14 CAPSULE | Refills: 0 | Status: SHIPPED | OUTPATIENT
Start: 2025-06-27 | End: 2025-07-04

## 2025-06-27 ASSESSMENT — COLUMBIA-SUICIDE SEVERITY RATING SCALE - C-SSRS
2. HAVE YOU ACTUALLY HAD ANY THOUGHTS OF KILLING YOURSELF IN THE PAST MONTH?: NO
1. IN THE PAST MONTH, HAVE YOU WISHED YOU WERE DEAD OR WISHED YOU COULD GO TO SLEEP AND NOT WAKE UP?: NO
6. HAVE YOU EVER DONE ANYTHING, STARTED TO DO ANYTHING, OR PREPARED TO DO ANYTHING TO END YOUR LIFE?: NO

## 2025-06-28 NOTE — DISCHARGE INSTRUCTIONS
Keep the wound dry for the next 24 hours.  Take the antibiotic to prevent infection.  After 24 hours you can remove the dressing, wash your hand normally.
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
## 2025-06-28 NOTE — ED TRIAGE NOTES
PT was picking up electric manjula when it cut her left hand middle finger tip.  Bleeding is controlled at this time . Pt is unsure of last tetanus      Triage Assessment (Adult)       Row Name 06/27/25 2008          Triage Assessment    Airway WDL WDL        Respiratory WDL    Respiratory WDL WDL        Skin Circulation/Temperature WDL    Skin Circulation/Temperature WDL WDL        Cardiac WDL    Cardiac WDL WDL        Peripheral/Neurovascular WDL    Peripheral Neurovascular WDL WDL        Cognitive/Neuro/Behavioral WDL    Cognitive/Neuro/Behavioral WDL WDL

## 2025-06-29 ENCOUNTER — HOSPITAL ENCOUNTER (EMERGENCY)
Facility: CLINIC | Age: 35
Discharge: HOME | End: 2025-06-29
Attending: STUDENT IN AN ORGANIZED HEALTH CARE EDUCATION/TRAINING PROGRAM | Admitting: STUDENT IN AN ORGANIZED HEALTH CARE EDUCATION/TRAINING PROGRAM
Payer: COMMERCIAL

## 2025-06-29 VITALS
RESPIRATION RATE: 16 BRPM | HEIGHT: 64 IN | SYSTOLIC BLOOD PRESSURE: 116 MMHG | OXYGEN SATURATION: 96 % | BODY MASS INDEX: 34.15 KG/M2 | WEIGHT: 200 LBS | TEMPERATURE: 97.9 F | HEART RATE: 95 BPM | DIASTOLIC BLOOD PRESSURE: 77 MMHG

## 2025-06-29 DIAGNOSIS — S61.213A LACERATION OF LEFT MIDDLE FINGER, FOREIGN BODY PRESENCE UNSPECIFIED, NAIL DAMAGE STATUS UNSPECIFIED, INITIAL ENCOUNTER: ICD-10-CM

## 2025-06-29 LAB
BASOPHILS # BLD AUTO: 0 10E3/UL (ref 0–0.2)
BASOPHILS NFR BLD AUTO: 0 %
CRP SERPL-MCNC: 6.5 MG/L
EOSINOPHIL # BLD AUTO: 0.1 10E3/UL (ref 0–0.7)
EOSINOPHIL NFR BLD AUTO: 1 %
ERYTHROCYTE [DISTWIDTH] IN BLOOD BY AUTOMATED COUNT: 12.7 % (ref 10–15)
ERYTHROCYTE [SEDIMENTATION RATE] IN BLOOD BY WESTERGREN METHOD: 19 MM/HR (ref 0–20)
HCT VFR BLD AUTO: 37.5 % (ref 35–47)
HGB BLD-MCNC: 12.8 G/DL (ref 11.7–15.7)
IMM GRANULOCYTES # BLD: 0 10E3/UL
IMM GRANULOCYTES NFR BLD: 0 %
LYMPHOCYTES # BLD AUTO: 1.5 10E3/UL (ref 0.8–5.3)
LYMPHOCYTES NFR BLD AUTO: 24 %
MCH RBC QN AUTO: 29.7 PG (ref 26.5–33)
MCHC RBC AUTO-ENTMCNC: 34.1 G/DL (ref 31.5–36.5)
MCV RBC AUTO: 87 FL (ref 78–100)
MONOCYTES # BLD AUTO: 0.5 10E3/UL (ref 0–1.3)
MONOCYTES NFR BLD AUTO: 8 %
NEUTROPHILS # BLD AUTO: 4.1 10E3/UL (ref 1.6–8.3)
NEUTROPHILS NFR BLD AUTO: 67 %
NRBC # BLD AUTO: 0 10E3/UL
NRBC BLD AUTO-RTO: 0 /100
PLATELET # BLD AUTO: 291 10E3/UL (ref 150–450)
RBC # BLD AUTO: 4.31 10E6/UL (ref 3.8–5.2)
WBC # BLD AUTO: 6.2 10E3/UL (ref 4–11)

## 2025-06-29 PROCEDURE — 99283 EMERGENCY DEPT VISIT LOW MDM: CPT

## 2025-06-29 PROCEDURE — 85652 RBC SED RATE AUTOMATED: CPT | Performed by: STUDENT IN AN ORGANIZED HEALTH CARE EDUCATION/TRAINING PROGRAM

## 2025-06-29 PROCEDURE — 36415 COLL VENOUS BLD VENIPUNCTURE: CPT | Performed by: STUDENT IN AN ORGANIZED HEALTH CARE EDUCATION/TRAINING PROGRAM

## 2025-06-29 PROCEDURE — 86140 C-REACTIVE PROTEIN: CPT | Performed by: STUDENT IN AN ORGANIZED HEALTH CARE EDUCATION/TRAINING PROGRAM

## 2025-06-29 PROCEDURE — 85004 AUTOMATED DIFF WBC COUNT: CPT | Performed by: STUDENT IN AN ORGANIZED HEALTH CARE EDUCATION/TRAINING PROGRAM

## 2025-06-29 RX ORDER — SULFAMETHOXAZOLE AND TRIMETHOPRIM 800; 160 MG/1; MG/1
1 TABLET ORAL 2 TIMES DAILY
Qty: 20 TABLET | Refills: 0 | Status: SHIPPED | OUTPATIENT
Start: 2025-06-29 | End: 2025-06-29

## 2025-06-29 RX ORDER — SULFAMETHOXAZOLE AND TRIMETHOPRIM 800; 160 MG/1; MG/1
1 TABLET ORAL 2 TIMES DAILY
Qty: 20 TABLET | Refills: 0 | Status: SHIPPED | OUTPATIENT
Start: 2025-06-29

## 2025-06-29 ASSESSMENT — ACTIVITIES OF DAILY LIVING (ADL): ADLS_ACUITY_SCORE: 41

## 2025-06-29 NOTE — DISCHARGE INSTRUCTIONS
Please start the bactrim and continue to take the keflex.    Call the [facility] line above for an appointment Monday or Tuesday.

## 2025-06-29 NOTE — ED NOTES
Patient verbalized understanding of discharge instructions including medication administration and recommended follow up care as noted on discharge instructions.  Written discharge instructions given, denies any further questions.  Prescriptions: were printed and sent with patient. Barriers to learning identified and addressed:  None observed.

## 2025-06-29 NOTE — ED TRIAGE NOTES
Middle finger was repaired by Dr Martell the other day.  Took bandage off last night.  Called tele nurse today due to increased swelling and line at the base of her finger.  On antibiotics, have not missed any doses.     Triage Assessment (Adult)       Row Name 06/29/25 133          Triage Assessment    Airway WDL WDL        Respiratory WDL    Respiratory WDL WDL        Skin Circulation/Temperature WDL    Skin Circulation/Temperature WDL X  left middle fingetr repaired the other day in ED.        Cardiac WDL    Cardiac WDL WDL        Peripheral/Neurovascular WDL    Peripheral Neurovascular WDL WDL        Cognitive/Neuro/Behavioral WDL    Cognitive/Neuro/Behavioral WDL WDL
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
## 2025-07-13 ENCOUNTER — HEALTH MAINTENANCE LETTER (OUTPATIENT)
Age: 35
End: 2025-07-13

## 2025-08-24 ENCOUNTER — HOSPITAL ENCOUNTER (EMERGENCY)
Facility: CLINIC | Age: 35
Discharge: LEFT BEFORE DISCHARGE | End: 2025-08-24
Admitting: EMERGENCY MEDICINE
Payer: COMMERCIAL

## 2025-08-24 VITALS
RESPIRATION RATE: 18 BRPM | BODY MASS INDEX: 37.73 KG/M2 | HEART RATE: 86 BPM | SYSTOLIC BLOOD PRESSURE: 131 MMHG | WEIGHT: 221 LBS | HEIGHT: 64 IN | OXYGEN SATURATION: 97 % | DIASTOLIC BLOOD PRESSURE: 65 MMHG | TEMPERATURE: 98.4 F

## 2025-08-24 LAB — S PYO DNA THROAT QL NAA+PROBE: NOT DETECTED

## 2025-08-24 PROCEDURE — 87651 STREP A DNA AMP PROBE: CPT | Performed by: EMERGENCY MEDICINE

## 2025-08-24 PROCEDURE — 99281 EMR DPT VST MAYX REQ PHY/QHP: CPT
